# Patient Record
Sex: MALE | Race: WHITE | Employment: FULL TIME | URBAN - METROPOLITAN AREA
[De-identification: names, ages, dates, MRNs, and addresses within clinical notes are randomized per-mention and may not be internally consistent; named-entity substitution may affect disease eponyms.]

---

## 2019-11-07 ENCOUNTER — LAB ENCOUNTER (OUTPATIENT)
Dept: LAB | Age: 34
End: 2019-11-07
Attending: INTERNAL MEDICINE
Payer: COMMERCIAL

## 2019-11-07 ENCOUNTER — OFFICE VISIT (OUTPATIENT)
Dept: INTERNAL MEDICINE CLINIC | Facility: CLINIC | Age: 34
End: 2019-11-07
Payer: COMMERCIAL

## 2019-11-07 VITALS
TEMPERATURE: 98 F | DIASTOLIC BLOOD PRESSURE: 82 MMHG | WEIGHT: 315 LBS | HEIGHT: 77 IN | HEART RATE: 75 BPM | SYSTOLIC BLOOD PRESSURE: 128 MMHG | OXYGEN SATURATION: 97 % | BODY MASS INDEX: 37.19 KG/M2 | RESPIRATION RATE: 18 BRPM

## 2019-11-07 DIAGNOSIS — L40.9 PSORIASIS: ICD-10-CM

## 2019-11-07 DIAGNOSIS — M25.511 RIGHT SHOULDER PAIN, UNSPECIFIED CHRONICITY: ICD-10-CM

## 2019-11-07 DIAGNOSIS — Z00.00 PHYSICAL EXAM: ICD-10-CM

## 2019-11-07 DIAGNOSIS — R42 VERTIGO: ICD-10-CM

## 2019-11-07 DIAGNOSIS — Z00.00 PHYSICAL EXAM: Primary | ICD-10-CM

## 2019-11-07 DIAGNOSIS — E66.9 OBESITY, UNSPECIFIED CLASSIFICATION, UNSPECIFIED OBESITY TYPE, UNSPECIFIED WHETHER SERIOUS COMORBIDITY PRESENT: ICD-10-CM

## 2019-11-07 PROCEDURE — 36415 COLL VENOUS BLD VENIPUNCTURE: CPT

## 2019-11-07 PROCEDURE — 80061 LIPID PANEL: CPT

## 2019-11-07 PROCEDURE — 80053 COMPREHEN METABOLIC PANEL: CPT

## 2019-11-07 PROCEDURE — 85025 COMPLETE CBC W/AUTO DIFF WBC: CPT

## 2019-11-07 PROCEDURE — 84443 ASSAY THYROID STIM HORMONE: CPT

## 2019-11-07 PROCEDURE — 99385 PREV VISIT NEW AGE 18-39: CPT | Performed by: INTERNAL MEDICINE

## 2019-11-07 RX ORDER — MECLIZINE HCL 12.5 MG/1
25 TABLET ORAL 3 TIMES DAILY PRN
Qty: 30 TABLET | Refills: 0 | Status: SHIPPED | OUTPATIENT
Start: 2019-11-07 | End: 2020-08-19 | Stop reason: ALTCHOICE

## 2019-11-07 RX ORDER — IBUPROFEN 800 MG/1
800 TABLET ORAL EVERY 6 HOURS PRN
COMMUNITY

## 2019-11-07 NOTE — PROGRESS NOTES
HPI:    Patient ID: Angeline Lopez is a 29year old male.     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 Endocrine: Negative for cold intolerance and heat intolerance. Genitourinary: Negative for dysuria, hematuria, testicular pain and urgency.    Musculoskeletal: Positive for arth Cardiovascular: Normal rate, regular rhythm and normal heart sounds. No murmur heard. Pulmonary/Chest: Effort normal and breath sounds normal. No respiratory distress. He has no wheezes. He has no rales. Abdominal: Soft.  He exhibits no distension an pain.  This appears to be more muscular in nature and is probably secondary to overuse syndrome from patient's repeated pitching/throwing baseball with that arm. We will get physical therapy evaluation. Patient has significant plaque psoriasis.   He singh

## 2019-11-08 ENCOUNTER — TELEPHONE (OUTPATIENT)
Dept: INTERNAL MEDICINE CLINIC | Facility: CLINIC | Age: 34
End: 2019-11-08

## 2019-11-08 PROBLEM — E66.9 OBESITY: Status: ACTIVE | Noted: 2019-11-08

## 2019-11-08 NOTE — TELEPHONE ENCOUNTER
----- Message from Shireen Gonzales MD sent at 11/8/2019 10:59 AM CST -----  Cholesterol is 190 with LDL of 125--goal LDL in this patient would be less than 130. This would improve further with good dietary habits, low-cholesterol diet and weight loss.   O

## 2019-11-27 ENCOUNTER — OFFICE VISIT (OUTPATIENT)
Dept: INTERNAL MEDICINE CLINIC | Facility: CLINIC | Age: 34
End: 2019-11-27
Payer: COMMERCIAL

## 2019-11-27 VITALS
OXYGEN SATURATION: 98 % | HEART RATE: 68 BPM | HEIGHT: 76 IN | SYSTOLIC BLOOD PRESSURE: 126 MMHG | BODY MASS INDEX: 38.36 KG/M2 | WEIGHT: 315 LBS | TEMPERATURE: 99 F | DIASTOLIC BLOOD PRESSURE: 84 MMHG

## 2019-11-27 DIAGNOSIS — R42 VERTIGO: Primary | ICD-10-CM

## 2019-11-27 DIAGNOSIS — E66.9 OBESITY, UNSPECIFIED CLASSIFICATION, UNSPECIFIED OBESITY TYPE, UNSPECIFIED WHETHER SERIOUS COMORBIDITY PRESENT: ICD-10-CM

## 2019-11-27 PROCEDURE — 99213 OFFICE O/P EST LOW 20 MIN: CPT | Performed by: INTERNAL MEDICINE

## 2019-11-27 PROCEDURE — 1111F DSCHRG MED/CURRENT MED MERGE: CPT | Performed by: INTERNAL MEDICINE

## 2019-11-27 NOTE — PROGRESS NOTES
HPI:    Patient ID: Derrell Harris is a 29year old male. At last visit, patient was started on meclizine for management of vertigo-like symptoms. He had been taking this for 5 days but did not note any significant change in his symptoms.   Continues to n Outpatient Medications   Medication Sig Dispense Refill   • ibuprofen 800 MG Oral Tab Take 800 mg by mouth every 6 (six) hours as needed for Pain. • Meclizine HCl 12.5 MG Oral Tab Take 2 tablets (25 mg total) by mouth 3 (three) times daily as needed.  3 it.  Therefore will not resume meclizine at this time. Patient also reminded to abstain from alcohol. Will refer patient to neurology for further evaluation/management. Patient agrees to make appointment.     We have also requested records from [facility]

## 2019-12-03 ENCOUNTER — TELEPHONE (OUTPATIENT)
Dept: NEUROLOGY | Facility: CLINIC | Age: 34
End: 2019-12-03

## 2019-12-03 ENCOUNTER — OFFICE VISIT (OUTPATIENT)
Dept: NEUROLOGY | Facility: CLINIC | Age: 34
End: 2019-12-03
Payer: COMMERCIAL

## 2019-12-03 VITALS — RESPIRATION RATE: 18 BRPM | BODY MASS INDEX: 38.36 KG/M2 | WEIGHT: 315 LBS | HEIGHT: 76 IN

## 2019-12-03 DIAGNOSIS — G47.9 SLEEP DISTURBANCES: ICD-10-CM

## 2019-12-03 DIAGNOSIS — R42 VERTIGO: Primary | ICD-10-CM

## 2019-12-03 PROCEDURE — 99204 OFFICE O/P NEW MOD 45 MIN: CPT | Performed by: OTHER

## 2019-12-03 NOTE — TELEPHONE ENCOUNTER
Lisy for [facility] [employer] for authorization of approval for CT temporal bones w/wo cpt code 35678. Approval was given withAuthorization Number: [id number] effective 12/03/19 to 01/17/20. Will call Pt. To inform. Pt.  Informed of approval. Transferred call t

## 2019-12-03 NOTE — PROGRESS NOTES
Neurology Initial Visit     Referred By: Dr. Rapp ref. provider found    Chief Complaint: Patient presents with:  Dizziness: New patient referred by Dr. Dina Crouch for vertigo.  States that he has had vertigo for about a month, patient states that dizziness come Father    • Diabetes Mother    • Cancer Mother    • Cancer Maternal Grandfather    • Heart Disorder Paternal Grandfather    • Stroke Paternal Grandfather          Current Outpatient Medications:   •  ibuprofen 800 MG Oral Tab, Take 800 mg by mouth every 6 symmetric  Patellar 2+ bilateral symmetric  Ankle jerk 2+ bilateral symmetric    No clonus  No Babinski sign    Coordination:  Finger to nose intact  Rapid alternating movements intact    Gait:  Normal posture  Normal physiologic      Labs:    Lab Results

## 2019-12-05 ENCOUNTER — TELEPHONE (OUTPATIENT)
Dept: PHYSICAL THERAPY | Facility: HOSPITAL | Age: 34
End: 2019-12-05

## 2019-12-07 ENCOUNTER — HOSPITAL ENCOUNTER (OUTPATIENT)
Dept: CT IMAGING | Facility: HOSPITAL | Age: 34
Discharge: HOME | End: 2019-12-07
Attending: Other
Payer: COMMERCIAL

## 2019-12-07 DIAGNOSIS — R42 VERTIGO: ICD-10-CM

## 2019-12-07 PROCEDURE — 70480 CT ORBIT/EAR/FOSSA W/O DYE: CPT | Performed by: OTHER

## 2019-12-09 ENCOUNTER — TELEPHONE (OUTPATIENT)
Dept: NEUROLOGY | Facility: CLINIC | Age: 34
End: 2019-12-09

## 2019-12-09 DIAGNOSIS — G47.10 HYPERSOMNIA: Primary | ICD-10-CM

## 2019-12-09 NOTE — TELEPHONE ENCOUNTER
----- Message from Alycia Ballard MD sent at 12/9/2019  7:26 AM CST -----  Please let the patient know that CT of the temporal bones didn't show any abnormality with inner ear, but it did show sinusitis.  Make sure he discusses it with ENT and neuro-o

## 2019-12-10 ENCOUNTER — OFFICE VISIT (OUTPATIENT)
Dept: INTERNAL MEDICINE CLINIC | Facility: CLINIC | Age: 34
End: 2019-12-10
Payer: COMMERCIAL

## 2019-12-10 VITALS
DIASTOLIC BLOOD PRESSURE: 82 MMHG | HEIGHT: 76 IN | BODY MASS INDEX: 38.36 KG/M2 | HEART RATE: 77 BPM | TEMPERATURE: 98 F | WEIGHT: 315 LBS | OXYGEN SATURATION: 99 % | SYSTOLIC BLOOD PRESSURE: 132 MMHG

## 2019-12-10 DIAGNOSIS — J32.9 CHRONIC SINUSITIS, UNSPECIFIED LOCATION: ICD-10-CM

## 2019-12-10 DIAGNOSIS — L40.9 PSORIASIS: ICD-10-CM

## 2019-12-10 DIAGNOSIS — L02.811 SCALP ABSCESS: Primary | ICD-10-CM

## 2019-12-10 PROCEDURE — 99213 OFFICE O/P EST LOW 20 MIN: CPT | Performed by: INTERNAL MEDICINE

## 2019-12-10 RX ORDER — AMOXICILLIN AND CLAVULANATE POTASSIUM 875; 125 MG/1; MG/1
1 TABLET, FILM COATED ORAL 2 TIMES DAILY
Qty: 20 TABLET | Refills: 0 | Status: SHIPPED | OUTPATIENT
Start: 2019-12-10 | End: 2019-12-20

## 2019-12-10 NOTE — PROGRESS NOTES
HPI:    Patient ID: Roberta Alan is a 29year old male. Patient presents for follow-up. He still does note some vertigo-like sensation  He saw neurologist who felt patient's symptoms were more peripherally vestibular related.   He sent patient for CT sc There is some swelling, erythema and warmth noted over left mastoid area. No fluctuance noted. No drainage noted. Neck: Neck supple. Cardiovascular: Normal rate, regular rhythm and normal heart sounds.    Pulmonary/Chest: Effort normal and breath so

## 2019-12-10 NOTE — TELEPHONE ENCOUNTER
Cristobal [email or web address] called with update. PA# [id number] States Sleep study was denied. Reason for denial was Pt. would have to have a home sleep study with abnormality first in order to proceed with outpatient diagnostic sleep study.  Will inform Dr. Gilberto Delgado

## 2019-12-19 ENCOUNTER — APPOINTMENT (OUTPATIENT)
Dept: PHYSICAL THERAPY | Facility: HOSPITAL | Age: 34
End: 2019-12-19
Attending: Other
Payer: COMMERCIAL

## 2020-01-03 ENCOUNTER — APPOINTMENT (OUTPATIENT)
Dept: PHYSICAL THERAPY | Facility: HOSPITAL | Age: 35
End: 2020-01-03
Attending: Other
Payer: COMMERCIAL

## 2020-01-08 ENCOUNTER — APPOINTMENT (OUTPATIENT)
Dept: PHYSICAL THERAPY | Facility: HOSPITAL | Age: 35
End: 2020-01-08
Attending: Other
Payer: COMMERCIAL

## 2020-01-10 ENCOUNTER — APPOINTMENT (OUTPATIENT)
Dept: PHYSICAL THERAPY | Facility: HOSPITAL | Age: 35
End: 2020-01-10
Attending: Other
Payer: COMMERCIAL

## 2020-01-15 ENCOUNTER — APPOINTMENT (OUTPATIENT)
Dept: PHYSICAL THERAPY | Facility: HOSPITAL | Age: 35
End: 2020-01-15
Attending: Other
Payer: COMMERCIAL

## 2020-01-21 ENCOUNTER — APPOINTMENT (OUTPATIENT)
Dept: PHYSICAL THERAPY | Facility: HOSPITAL | Age: 35
End: 2020-01-21
Attending: Other
Payer: COMMERCIAL

## 2020-01-23 ENCOUNTER — APPOINTMENT (OUTPATIENT)
Dept: PHYSICAL THERAPY | Facility: HOSPITAL | Age: 35
End: 2020-01-23
Attending: Other
Payer: COMMERCIAL

## 2020-03-11 ENCOUNTER — TELEPHONE (OUTPATIENT)
Dept: INTERNAL MEDICINE CLINIC | Facility: CLINIC | Age: 35
End: 2020-03-11

## 2020-03-11 NOTE — TELEPHONE ENCOUNTER
Pt reports ear ache x3 days. No fever/chills. Advised MD hannon. No openings today. Suggested pt can go to UC today or schedule appt tomorrow. Pt scheduled to see Dr. John Lagos tomorrow.  Advised UC for worsening symptoms fever, pain, dizziness before then--pt verbal

## 2020-03-11 NOTE — TELEPHONE ENCOUNTER
Patient is calling with an ear infection. Patient states he has had an earache for three days and is worried because the last time he had this happen he was dizzy for 6 months and had to get MRIs and other test done to stop it.      Pharmacy: [facility] in [place]

## 2020-05-05 ENCOUNTER — LAB ENCOUNTER (OUTPATIENT)
Dept: LAB | Age: 35
End: 2020-05-05
Attending: PHYSICIAN ASSISTANT
Payer: COMMERCIAL

## 2020-05-05 DIAGNOSIS — L40.0 PSORIASIS VULGARIS: Primary | ICD-10-CM

## 2020-05-05 PROCEDURE — 86480 TB TEST CELL IMMUN MEASURE: CPT

## 2020-05-05 PROCEDURE — 86803 HEPATITIS C AB TEST: CPT

## 2020-05-05 PROCEDURE — 80076 HEPATIC FUNCTION PANEL: CPT

## 2020-05-05 PROCEDURE — 36415 COLL VENOUS BLD VENIPUNCTURE: CPT

## 2020-05-05 PROCEDURE — 80048 BASIC METABOLIC PNL TOTAL CA: CPT

## 2020-05-05 PROCEDURE — 86704 HEP B CORE ANTIBODY TOTAL: CPT

## 2020-05-05 PROCEDURE — 86706 HEP B SURFACE ANTIBODY: CPT

## 2020-05-05 PROCEDURE — 87340 HEPATITIS B SURFACE AG IA: CPT

## 2020-05-05 PROCEDURE — 80061 LIPID PANEL: CPT

## 2020-05-05 PROCEDURE — 85025 COMPLETE CBC W/AUTO DIFF WBC: CPT

## 2020-05-12 ENCOUNTER — TELEPHONE (OUTPATIENT)
Dept: INTERNAL MEDICINE CLINIC | Facility: CLINIC | Age: 35
End: 2020-05-12

## 2020-05-12 NOTE — TELEPHONE ENCOUNTER
Yes patient should self quarantine at home for 2 weeks. He should take his temperature twice daily and notify us if it gets greater than 100.6 or if he should develop significant cough or shortness of breath.   Patient should try to stay in his own room an

## 2020-05-12 NOTE — TELEPHONE ENCOUNTER
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

## 2020-05-12 NOTE — TELEPHONE ENCOUNTER
Respiratory infection triage:    Fever:  [x]  No fever  []  Fever>100.4    Cough:  [] Tight cough  [] Cough with exertion  [] Dry cough  [] Sputum production, Color:     Breathing:  [] Mild shortness of breath interfering with activity  [] Wheezing  [] Jose Ramon Lynn

## 2020-05-14 NOTE — TELEPHONE ENCOUNTER
Call transferred from . Patient and fiance on the line. Patient has been monitoring his symptoms please see documentation below, per Dr Rachel Chan instruction.   Patient has had 3 coworkers that have tested positive for COVID 19    This morning pat

## 2020-05-14 NOTE — TELEPHONE ENCOUNTER
To Dr. Mike Morton  Pt denies cough/respiratory sx. Thermometer broke - no temp. Reviewed your message below with pt, understanding verbalized.

## 2020-05-14 NOTE — TELEPHONE ENCOUNTER
Pt. Called today. He feels clammy, achey body. Wants to speak with the nurse. Please call him at [phone].

## 2020-05-15 DIAGNOSIS — R50.9 FEVER, UNSPECIFIED FEVER CAUSE: Primary | ICD-10-CM

## 2020-05-15 NOTE — TELEPHONE ENCOUNTER
If patient has had temperature of 100.9, he now meets criteria for COVID-19 testing. Same advice regarding self isolation.   I have also now placed order for COVID-19 testing--this will be reviewed and patient will be contacted today and given a time and s

## 2020-05-16 ENCOUNTER — LAB ENCOUNTER (OUTPATIENT)
Dept: LAB | Facility: HOSPITAL | Age: 35
End: 2020-05-16
Attending: INTERNAL MEDICINE
Payer: COMMERCIAL

## 2020-05-16 DIAGNOSIS — R50.9 FEVER, UNSPECIFIED FEVER CAUSE: ICD-10-CM

## 2020-05-18 ENCOUNTER — PATIENT OUTREACH (OUTPATIENT)
Dept: CASE MANAGEMENT | Age: 35
End: 2020-05-18

## 2020-05-18 NOTE — TELEPHONE ENCOUNTER
Per Dr. Michelle Gonzalez' result note message, dictated earlier this morning:    Terry Mckeon MD  P [facility] Clinical Staff             COVID-19 testing is coming back positive. Therefore patient needs to remain at home and self quarantine.    He should stay

## 2020-05-18 NOTE — PROGRESS NOTES
Home Monitoring Condition Update    Covid19+ test date: 5/16/20  Contact date: 5/18/20      Consent Verification:  Assessment Completed With: Patient  HIPAA Verified?   Yes    COVID-19 HOME MONITORING 5/18/2020   Temperature 98.3   Reading From Mouth   Pu call tomorrow to get a condition update  Patient advised to inform their Employee Health department or Manager when they have tested positive for COVID-19.     The patient was also directed to continue to isolate away from other household members when [address]

## 2020-05-18 NOTE — TELEPHONE ENCOUNTER
Pt tested positive for Cov 19 and he like to know what he should do now. Pt also had a few questions regarding the testing.

## 2020-05-19 ENCOUNTER — PATIENT OUTREACH (OUTPATIENT)
Dept: CASE MANAGEMENT | Age: 35
End: 2020-05-19

## 2020-05-20 NOTE — PROGRESS NOTES
Home Monitoring Condition Update    Covid19+ test date: 5/16/2020      Consent Verification:  Assessment Completed With: Patient  HIPAA Verified?   Yes    COVID-19 HOME MONITORING 5/20/2020   Temperature 98.4   Reading From Mouth   Pulse 60   Pulse taken The patient was also directed to continue to isolate away from other household members when possible and stay completely isolated from the general public 3 days after symptoms resolve or 10 days total (whichever is longer).   Advised patient If symptoms

## 2020-05-21 ENCOUNTER — PATIENT OUTREACH (OUTPATIENT)
Dept: CASE MANAGEMENT | Age: 35
End: 2020-05-21

## 2020-05-21 NOTE — PROGRESS NOTES
Home Monitoring Condition Update    Covid19+ test date: 5/16/2020      Consent Verification:  Assessment Completed With: Patient  HIPAA Verified?   Yes    COVID-19 HOME MONITORING 5/21/2020   Temperature 98.5   Reading From Mouth   Pulse 88   Pulse taken when possible and stay completely isolated from the general public 3 days after symptoms resolve or 10 days total (whichever is longer). Advised patient If symptoms worsen/ medical emergency to call 911.       Time spent this encounter reviewing chart, spe

## 2020-05-22 ENCOUNTER — VIRTUAL PHONE E/M (OUTPATIENT)
Dept: INTERNAL MEDICINE CLINIC | Facility: CLINIC | Age: 35
End: 2020-05-22
Payer: COMMERCIAL

## 2020-05-22 ENCOUNTER — TELEPHONE (OUTPATIENT)
Dept: CASE MANAGEMENT | Age: 35
End: 2020-05-22

## 2020-05-22 DIAGNOSIS — U07.1 COVID-19 VIRUS INFECTION: Primary | ICD-10-CM

## 2020-05-22 DIAGNOSIS — E66.9 OBESITY, UNSPECIFIED CLASSIFICATION, UNSPECIFIED OBESITY TYPE, UNSPECIFIED WHETHER SERIOUS COMORBIDITY PRESENT: ICD-10-CM

## 2020-05-22 PROCEDURE — 99213 OFFICE O/P EST LOW 20 MIN: CPT | Performed by: INTERNAL MEDICINE

## 2020-05-22 NOTE — TELEPHONE ENCOUNTER
Patient had a virtual visit today with PCP. Ok to discharge from home monitoring program? If continued monitoring needed, how frequently and for how long? Please advise. Please respond to Celso Wasserman.

## 2020-05-22 NOTE — PROGRESS NOTES
Virtual Telephone Check-In    Derrell Harris verbally consents to a Virtual/Telephone Check-In visit on 05/22/20. Patient has been referred to the [facility] website at [email or web address] to review the yearly Consent to Treat document.     Patient understand

## 2020-06-10 ENCOUNTER — TELEPHONE (OUTPATIENT)
Dept: INTERNAL MEDICINE CLINIC | Facility: CLINIC | Age: 35
End: 2020-06-10

## 2020-06-10 NOTE — TELEPHONE ENCOUNTER
Letter sent through communications per MD direction. Patient called, stated he will access the letter through [address] No need to fax.

## 2020-06-10 NOTE — TELEPHONE ENCOUNTER
Patient had virtual visit with Dr Aimee Chew 5/22  He was released & went back to work 6/1  Employer still has not received return to work note  Patient asks that it is emailed today to    [email or web address]  And also to patient via [address]

## 2020-08-17 ENCOUNTER — TELEPHONE (OUTPATIENT)
Dept: INTERNAL MEDICINE CLINIC | Facility: CLINIC | Age: 35
End: 2020-08-17

## 2020-08-17 NOTE — TELEPHONE ENCOUNTER
Wife called     For the last 3 -4 weeks patient has been having blurry spots in his vision  Also has headaches every day    Ordered new glasses which did not help    Please call patient to triage/advise [phone]

## 2020-08-19 ENCOUNTER — OFFICE VISIT (OUTPATIENT)
Dept: INTERNAL MEDICINE CLINIC | Facility: CLINIC | Age: 35
End: 2020-08-19
Payer: COMMERCIAL

## 2020-08-19 VITALS
OXYGEN SATURATION: 97 % | BODY MASS INDEX: 37.75 KG/M2 | DIASTOLIC BLOOD PRESSURE: 82 MMHG | TEMPERATURE: 99 F | HEART RATE: 72 BPM | SYSTOLIC BLOOD PRESSURE: 122 MMHG | HEIGHT: 76 IN | WEIGHT: 310 LBS

## 2020-08-19 DIAGNOSIS — L40.9 PSORIASIS: ICD-10-CM

## 2020-08-19 DIAGNOSIS — R51.9 HEADACHE DISORDER: Primary | ICD-10-CM

## 2020-08-19 DIAGNOSIS — H53.9 VISION CHANGES: ICD-10-CM

## 2020-08-19 DIAGNOSIS — Z86.16 HISTORY OF 2019 NOVEL CORONAVIRUS DISEASE (COVID-19): ICD-10-CM

## 2020-08-19 PROCEDURE — 3008F BODY MASS INDEX DOCD: CPT | Performed by: INTERNAL MEDICINE

## 2020-08-19 PROCEDURE — 99213 OFFICE O/P EST LOW 20 MIN: CPT | Performed by: INTERNAL MEDICINE

## 2020-08-19 PROCEDURE — 3079F DIAST BP 80-89 MM HG: CPT | Performed by: INTERNAL MEDICINE

## 2020-08-19 PROCEDURE — 3074F SYST BP LT 130 MM HG: CPT | Performed by: INTERNAL MEDICINE

## 2020-08-19 NOTE — PROGRESS NOTES
HPI:    Patient ID: Torres Horn is a 29year old male. Presents with c/o of pain behind left eye along with change in vision that started approximately 3 to 4 weeks ago.   Patient describes vision changes as a smudge that he sees--sometimes occurring in Mouth/Throat: Oropharynx is clear and moist. No oropharyngeal exudate. There is no tenderness to palpation over maxillary sinus areas. Eyes: Pupils are equal, round, and reactive to light.  Conjunctivae and EOM are normal.   Both pupils are sensitive

## 2020-08-21 ENCOUNTER — TELEPHONE (OUTPATIENT)
Dept: INTERNAL MEDICINE CLINIC | Facility: CLINIC | Age: 35
End: 2020-08-21

## 2020-08-22 ENCOUNTER — HOSPITAL ENCOUNTER (OUTPATIENT)
Dept: MRI IMAGING | Facility: HOSPITAL | Age: 35
Discharge: HOME | End: 2020-08-22
Attending: INTERNAL MEDICINE
Payer: COMMERCIAL

## 2020-08-22 DIAGNOSIS — H53.9 VISION CHANGES: ICD-10-CM

## 2020-08-22 PROCEDURE — 70551 MRI BRAIN STEM W/O DYE: CPT | Performed by: INTERNAL MEDICINE

## 2020-08-22 NOTE — TELEPHONE ENCOUNTER
Received a phone call from patient. This was approximately 5 PM.  Patient indicated that he was having blurry vision. Left eye mostly. Like a \"dust\" was over his eye. Also in his right eye. The right eye problem is been for 3 weeks.   The left eye fo

## 2020-08-26 ENCOUNTER — OFFICE VISIT (OUTPATIENT)
Dept: INTERNAL MEDICINE CLINIC | Facility: CLINIC | Age: 35
End: 2020-08-26
Payer: COMMERCIAL

## 2020-08-26 VITALS
SYSTOLIC BLOOD PRESSURE: 124 MMHG | TEMPERATURE: 98 F | DIASTOLIC BLOOD PRESSURE: 86 MMHG | OXYGEN SATURATION: 98 % | WEIGHT: 308.81 LBS | BODY MASS INDEX: 37.6 KG/M2 | HEIGHT: 76 IN | HEART RATE: 88 BPM

## 2020-08-26 DIAGNOSIS — R73.9 HYPERGLYCEMIA: ICD-10-CM

## 2020-08-26 DIAGNOSIS — J32.9 CHRONIC SINUSITIS, UNSPECIFIED LOCATION: ICD-10-CM

## 2020-08-26 DIAGNOSIS — H57.12 LEFT EYE PAIN: Primary | ICD-10-CM

## 2020-08-26 DIAGNOSIS — G44.009 CLUSTER HEADACHE, NOT INTRACTABLE, UNSPECIFIED CHRONICITY PATTERN: ICD-10-CM

## 2020-08-26 LAB
CARTRIDGE LOT#: NORMAL NUMERIC
HEMOGLOBIN A1C: 5.2 % (ref 4.3–5.6)

## 2020-08-26 PROCEDURE — 3008F BODY MASS INDEX DOCD: CPT | Performed by: INTERNAL MEDICINE

## 2020-08-26 PROCEDURE — 99213 OFFICE O/P EST LOW 20 MIN: CPT | Performed by: INTERNAL MEDICINE

## 2020-08-26 PROCEDURE — 83036 HEMOGLOBIN GLYCOSYLATED A1C: CPT | Performed by: INTERNAL MEDICINE

## 2020-08-26 PROCEDURE — 3074F SYST BP LT 130 MM HG: CPT | Performed by: INTERNAL MEDICINE

## 2020-08-26 PROCEDURE — 3079F DIAST BP 80-89 MM HG: CPT | Performed by: INTERNAL MEDICINE

## 2020-08-26 PROCEDURE — 36416 COLLJ CAPILLARY BLOOD SPEC: CPT | Performed by: INTERNAL MEDICINE

## 2020-08-27 NOTE — PROGRESS NOTES
HPI:    Patient ID: Kristen Jiménez is a 29year old male. Patient presents for follow-up. Patient continues to note discomfort behind left eye and surrounding left eye.     MRI brain did not show any significant abnormalities other than significant montano Allergies:No Known Allergies   PHYSICAL EXAM:   /86 (BP Location: Right arm, Patient Position: Sitting, Cuff Size: large)   Pulse 88   Temp 98.3 °F (36.8 °C) (Temporal)   Ht 6' 4\" (1.93 m)   Wt (!) 308 lb 12.8 oz (140.1 kg)   SpO2 98%   BMI 37. 5 week.    Hemoglobin A1c done today in office is 5.2--therefore I reassured patient that he does not have diabetes,  Though he may be at risk secondary to family history and he needs to work on healthy weight management to good dietary habits and regular ex

## 2020-09-04 ENCOUNTER — TELEPHONE (OUTPATIENT)
Dept: INTERNAL MEDICINE CLINIC | Facility: CLINIC | Age: 35
End: 2020-09-04

## 2020-09-04 NOTE — TELEPHONE ENCOUNTER
Needs may lab results sent to [facility]  Patient is there now for an appt  Please send asap to  Fax # [phone] for results ordered by LISA Manning

## 2020-09-04 NOTE — TELEPHONE ENCOUNTER
We cannot send records of labs that were ordered by a provider not associated with this practice. Pt should pull up labs on his [employer] or contact medical records.

## 2020-10-05 NOTE — TELEPHONE ENCOUNTER
Mickey Ledesma, called to check on the below request. Munira Arellano is on verbal release as someone we can release any and all information to regarding the patient.  Munira Arellano was informed that the order for blood work was not ordered by any physicians in our office and

## 2021-04-19 ENCOUNTER — IMMUNIZATION (OUTPATIENT)
Dept: LAB | Age: 36
End: 2021-04-19
Attending: HOSPITALIST
Payer: COMMERCIAL

## 2021-04-19 DIAGNOSIS — Z23 NEED FOR VACCINATION: Primary | ICD-10-CM

## 2021-04-19 PROCEDURE — 0001A SARSCOV2 VAC 30MCG/0.3ML IM: CPT

## 2021-05-15 ENCOUNTER — IMMUNIZATION (OUTPATIENT)
Dept: LAB | Age: 36
End: 2021-05-15
Attending: HOSPITALIST
Payer: COMMERCIAL

## 2021-05-15 DIAGNOSIS — Z23 NEED FOR VACCINATION: Primary | ICD-10-CM

## 2021-05-15 PROCEDURE — 0002A SARSCOV2 VAC 30MCG/0.3ML IM: CPT

## 2021-05-26 ENCOUNTER — TELEPHONE (OUTPATIENT)
Dept: INTERNAL MEDICINE CLINIC | Facility: CLINIC | Age: 36
End: 2021-05-26

## 2021-05-26 NOTE — TELEPHONE ENCOUNTER
Called spouse per hipaa and explained that patient needs to be seen first before sleep study can be ordered - transferred to [facility] to schedule patient

## 2021-05-26 NOTE — TELEPHONE ENCOUNTER
Please call [place]   Pt requesting referral for sleep center  Hoping to schedule  Tasked to nursing

## 2021-08-03 ENCOUNTER — APPOINTMENT (OUTPATIENT)
Dept: ULTRASOUND IMAGING | Facility: HOSPITAL | Age: 36
End: 2021-08-03
Payer: COMMERCIAL

## 2021-08-03 ENCOUNTER — APPOINTMENT (OUTPATIENT)
Dept: GENERAL RADIOLOGY | Facility: HOSPITAL | Age: 36
End: 2021-08-03
Attending: NURSE PRACTITIONER
Payer: COMMERCIAL

## 2021-08-03 ENCOUNTER — HOSPITAL ENCOUNTER (EMERGENCY)
Facility: HOSPITAL | Age: 36
Discharge: HOME | End: 2021-08-03
Payer: COMMERCIAL

## 2021-08-03 ENCOUNTER — TELEPHONE (OUTPATIENT)
Dept: INTERNAL MEDICINE CLINIC | Facility: CLINIC | Age: 36
End: 2021-08-03

## 2021-08-03 VITALS
OXYGEN SATURATION: 97 % | HEART RATE: 87 BPM | WEIGHT: 308.63 LBS | SYSTOLIC BLOOD PRESSURE: 152 MMHG | TEMPERATURE: 98 F | BODY MASS INDEX: 38 KG/M2 | DIASTOLIC BLOOD PRESSURE: 76 MMHG | RESPIRATION RATE: 20 BRPM

## 2021-08-03 DIAGNOSIS — M79.661 RIGHT CALF PAIN: Primary | ICD-10-CM

## 2021-08-03 PROCEDURE — 93971 EXTREMITY STUDY: CPT

## 2021-08-03 PROCEDURE — 99284 EMERGENCY DEPT VISIT MOD MDM: CPT

## 2021-08-03 NOTE — ED INITIAL ASSESSMENT (HPI)
Pt reports pain, swelling and \"tightness\" to right calf starting upon waking this morning. Pt denies hx blood clots. Denies sob, chest pain or recent long travel.  Pt reports feeling of constant muscle cramp

## 2021-08-03 NOTE — ED PROVIDER NOTES
Patient Seen in: [facility] AND [facility] Emergency Department      History   Patient presents with:  Leg or Foot Injury    Stated Complaint: right leg pain     HPI/Subjective:   HPI    59-year-old male presents the emergency department for evaluation.   Patient Physical Exam  Vitals reviewed. Constitutional:       Appearance: Normal appearance. HENT:      Head: Normocephalic and atraumatic.       Right Ear: External ear normal.      Left Ear: External ear normal.      Nose: Nose normal.      Mouth/Thro Patient is ambulatory without difficulty. Vital signs are stable. Return as needed.                            Disposition and Plan     Clinical Impression:  Right calf pain  (primary encounter diagnosis)     Disposition:  Discharge  8/3/2021  7:25 pm

## 2021-08-03 NOTE — TELEPHONE ENCOUNTER
Patient's wife [employer] is calling this morning patient developed pain in his right calf, the pain has been going on all day  He is afraid it is a blood clot    Patient would like to be seen, what should patient do   Please call patient [phone]

## 2021-08-03 NOTE — TELEPHONE ENCOUNTER
Called patient who states he has pain in right calf since this morning ,feel really tight.  RN instructed patient to go to ER to R/O DVT - agrees F/U 8/4

## 2021-08-04 NOTE — TELEPHONE ENCOUNTER
No need for follow-up at this time. Ultrasound is negative for DVT.   If no symptomatic improvement in 7 -10 days, should then schedule office evaluation

## 2021-08-04 NOTE — TELEPHONE ENCOUNTER
Pt cancelled appt for today  Pt wanted Dr Marco A Kohli to be aware  US results are in patient chart  If patient does need to be seen in the office please call him  Tasked to Dr Marco A Kohli

## 2021-08-04 NOTE — TELEPHONE ENCOUNTER
I spoke with patient to follow up on ER evaluation from 8/3/21. Patient says he does not have a DVT but he does have ongoing tightness in his muscle. He says that he is on a new medication called Jose Martin Copa and he cannot take many medications because of this.  Vidya Rivera

## 2021-08-10 ENCOUNTER — OFFICE VISIT (OUTPATIENT)
Dept: INTERNAL MEDICINE CLINIC | Facility: CLINIC | Age: 36
End: 2021-08-10
Payer: COMMERCIAL

## 2021-08-10 VITALS
WEIGHT: 315 LBS | HEIGHT: 76 IN | TEMPERATURE: 99 F | BODY MASS INDEX: 38.36 KG/M2 | OXYGEN SATURATION: 98 % | HEART RATE: 83 BPM | DIASTOLIC BLOOD PRESSURE: 90 MMHG | SYSTOLIC BLOOD PRESSURE: 130 MMHG

## 2021-08-10 DIAGNOSIS — L40.9 PSORIASIS: ICD-10-CM

## 2021-08-10 DIAGNOSIS — E66.9 OBESITY, UNSPECIFIED CLASSIFICATION, UNSPECIFIED OBESITY TYPE, UNSPECIFIED WHETHER SERIOUS COMORBIDITY PRESENT: ICD-10-CM

## 2021-08-10 DIAGNOSIS — M79.661 RIGHT CALF PAIN: ICD-10-CM

## 2021-08-10 DIAGNOSIS — M54.10 RADICULAR PAIN OF RIGHT LOWER EXTREMITY: Primary | ICD-10-CM

## 2021-08-10 PROCEDURE — 99213 OFFICE O/P EST LOW 20 MIN: CPT | Performed by: INTERNAL MEDICINE

## 2021-08-10 PROCEDURE — 3075F SYST BP GE 130 - 139MM HG: CPT | Performed by: INTERNAL MEDICINE

## 2021-08-10 PROCEDURE — 3080F DIAST BP >= 90 MM HG: CPT | Performed by: INTERNAL MEDICINE

## 2021-08-10 PROCEDURE — 3008F BODY MASS INDEX DOCD: CPT | Performed by: INTERNAL MEDICINE

## 2021-08-10 RX ORDER — METHYLPREDNISOLONE 4 MG/1
TABLET ORAL
Qty: 1 EACH | Refills: 0 | Status: SHIPPED | OUTPATIENT
Start: 2021-08-10

## 2021-08-10 RX ORDER — CLOBETASOL PROPIONATE 0.46 MG/ML
SOLUTION TOPICAL
COMMUNITY
Start: 2021-07-23

## 2021-08-10 NOTE — PROGRESS NOTES
HPI:    Patient ID: Quynh Winters is a 28year old male. Presents with c/o right calf pain started 8/2 the evening after playing baseball. Symptoms were predominantly in the right calf and patient presented to emergency room on 8/3.   Venous ultrasound w (six) hours as needed for Pain. Allergies:No Known Allergies   PHYSICAL EXAM:   /90   Pulse 83   Temp 98.8 °F (37.1 °C) (Oral)   Ht 6' 4\" (1.93 m)   Wt (!) 372 lb 9.6 oz (169 kg)   SpO2 98%   BMI 45.35 kg/m²   Physical Exam  Vitals reviewed. needed. We will also add Medrol Dosepak x1. We will get physical therapy evaluation. If no improvement with physical therapy, patient will then need imaging studies.     Certainly patient's increased weight gain over the last year will be contributing to

## 2021-08-30 ENCOUNTER — TELEPHONE (OUTPATIENT)
Dept: PHYSICAL THERAPY | Facility: HOSPITAL | Age: 36
End: 2021-08-30

## 2021-09-07 ENCOUNTER — APPOINTMENT (OUTPATIENT)
Dept: PHYSICAL THERAPY | Age: 36
End: 2021-09-07
Attending: INTERNAL MEDICINE
Payer: COMMERCIAL

## 2021-09-09 ENCOUNTER — TELEPHONE (OUTPATIENT)
Dept: PHYSICAL THERAPY | Facility: HOSPITAL | Age: 36
End: 2021-09-09

## 2021-09-09 ENCOUNTER — APPOINTMENT (OUTPATIENT)
Dept: PHYSICAL THERAPY | Age: 36
End: 2021-09-09
Attending: INTERNAL MEDICINE
Payer: COMMERCIAL

## 2021-09-14 ENCOUNTER — OFFICE VISIT (OUTPATIENT)
Dept: PHYSICAL THERAPY | Age: 36
End: 2021-09-14
Attending: INTERNAL MEDICINE
Payer: COMMERCIAL

## 2021-09-14 PROCEDURE — 97162 PT EVAL MOD COMPLEX 30 MIN: CPT

## 2021-09-14 PROCEDURE — 97110 THERAPEUTIC EXERCISES: CPT

## 2021-09-14 NOTE — PROGRESS NOTES
SPINE EVALUATION:   Referring Physician: Dr. Ju Hahn  Diagnosis: LBP w/ R LE radiculopathy down to R calf    Date of Service: 9/14/2021     PATIENT SUMMARY   Jorge A Reich is a 28year old male who presents to therapy today with complaints of LBP and RLE r tightness. Extension: 100% w/o issue.   Sidebending: R 75% w/ R low back and glute pain; L 100%    Accessory motion: n/t  Palpation: n/t    Strength: (* denotes performed with pain)  LE   Hip flexion (L2): R 5/5; L 5/5  Hip abduction: R 4-/5; L 4-/5   Knee symptoms for 3 consecutive days to improve function with ADL   · Pt will have decreased paraspinal mm tension to tolerate standing >60 minutes for work and home activities   · Pt will demonstrate improved core strength to be able to perform 4+/5 with <1/10

## 2021-09-15 ENCOUNTER — TELEPHONE (OUTPATIENT)
Dept: PHYSICAL THERAPY | Facility: HOSPITAL | Age: 36
End: 2021-09-15

## 2021-09-16 ENCOUNTER — OFFICE VISIT (OUTPATIENT)
Dept: PHYSICAL THERAPY | Age: 36
End: 2021-09-16
Attending: INTERNAL MEDICINE
Payer: COMMERCIAL

## 2021-09-16 PROCEDURE — 97110 THERAPEUTIC EXERCISES: CPT

## 2021-09-16 PROCEDURE — 97140 MANUAL THERAPY 1/> REGIONS: CPT

## 2021-09-17 NOTE — PROGRESS NOTES
Dx: LBP w/ R LE radiculopathy down to R calf           Insurance (Authorized # of Visits):  No auth required           Authorizing Physician: Dr. Ju Temple MD visit: none scheduled  Fall Risk: standard         Precautions: n/a             Subjective: pt TX#: 5/ Date:    Tx#: 6/   PPU - hands w/ exhale overpressure 3x10       Prone on elbows - 2' (abolished radic s/s)       B gastroc slant stretch 3x15\"       Seated ankle pumps x20        Modified piriformis stretch      LTRs x10       PPTs x

## 2021-09-21 ENCOUNTER — OFFICE VISIT (OUTPATIENT)
Dept: PHYSICAL THERAPY | Age: 36
End: 2021-09-21
Attending: INTERNAL MEDICINE
Payer: COMMERCIAL

## 2021-09-21 PROCEDURE — 97110 THERAPEUTIC EXERCISES: CPT

## 2021-09-21 PROCEDURE — 97140 MANUAL THERAPY 1/> REGIONS: CPT

## 2021-09-21 NOTE — PROGRESS NOTES
Dx: LBP w/ R LE radiculopathy down to R calf           Insurance (Authorized # of Visits):  No auth required           Authorizing Physician: Dr. Danielito Zamarripa  Next MD visit: none scheduled  Fall Risk: standard         Precautions: n/a             Subjective: pt TX#: 4/ Date:                 TX#: 5/ Date:    Tx#: 6/   PPU - hands w/ exhale overpressure 3x10 PPU - hands w/ exhale overpressure 3x10      Prone on elbows - 2' (abolished radic s/s) Prone on elbows - 2'      B gastroc slant stretch 3x15\"       Seated

## 2021-09-23 ENCOUNTER — TELEPHONE (OUTPATIENT)
Dept: PHYSICAL THERAPY | Facility: HOSPITAL | Age: 36
End: 2021-09-23

## 2021-09-23 ENCOUNTER — APPOINTMENT (OUTPATIENT)
Dept: PHYSICAL THERAPY | Age: 36
End: 2021-09-23
Attending: INTERNAL MEDICINE
Payer: COMMERCIAL

## 2021-09-28 ENCOUNTER — OFFICE VISIT (OUTPATIENT)
Dept: PHYSICAL THERAPY | Age: 36
End: 2021-09-28
Attending: INTERNAL MEDICINE
Payer: COMMERCIAL

## 2021-09-28 PROCEDURE — 97110 THERAPEUTIC EXERCISES: CPT

## 2021-09-28 PROCEDURE — 97140 MANUAL THERAPY 1/> REGIONS: CPT

## 2021-09-28 NOTE — PROGRESS NOTES
Dx: LBP w/ R LE radiculopathy down to R calf           Insurance (Authorized # of Visits):  No auth required           Authorizing Physician: Dr. Effie Lenz  Next MD visit: none scheduled  Fall Risk: standard         Precautions: n/a             Subjective: pt auth required Date:   9/21/21              TX#: 3/no auth req Date:   9/28/21              TX#: 4/no Marino Pretty req Date:                 TX#: 5/ Date:    Tx#: 6/   PPU - hands w/ exhale overpressure 3x10 PPU - hands w/ exhale overpressure 3x10 PPU - hands w/ exh

## 2021-09-30 ENCOUNTER — TELEPHONE (OUTPATIENT)
Dept: PHYSICAL THERAPY | Age: 36
End: 2021-09-30

## 2021-09-30 ENCOUNTER — APPOINTMENT (OUTPATIENT)
Dept: PHYSICAL THERAPY | Age: 36
End: 2021-09-30
Attending: INTERNAL MEDICINE
Payer: COMMERCIAL

## 2021-10-12 ENCOUNTER — OFFICE VISIT (OUTPATIENT)
Dept: PHYSICAL THERAPY | Age: 36
End: 2021-10-12
Attending: INTERNAL MEDICINE
Payer: COMMERCIAL

## 2021-10-12 PROCEDURE — 97110 THERAPEUTIC EXERCISES: CPT

## 2021-10-12 PROCEDURE — 97140 MANUAL THERAPY 1/> REGIONS: CPT

## 2021-10-12 NOTE — PROGRESS NOTES
Dx: LBP w/ R LE radiculopathy down to R calf           Insurance (Authorized # of Visits):  No auth required           Authorizing Physician: Dr. Mallory Mcginnis Next MD visit: none scheduled  Fall Risk: standard         Precautions: n/a              Subjective: pt 9/16/2021  TX#: 2/no auth required Date:   9/21/21              TX#: 3/no auth req Date:   9/28/21              TX#: 4/no Lela Lowdemarco req Date:   10/12/21              TX#: Pham Ramirez req Date:    Tx#: 6/   PPU - hands w/ exhale overpressure 3x10 PPU - hands w/ exh

## 2021-10-14 ENCOUNTER — APPOINTMENT (OUTPATIENT)
Dept: PHYSICAL THERAPY | Age: 36
End: 2021-10-14
Attending: INTERNAL MEDICINE
Payer: COMMERCIAL

## 2021-10-14 ENCOUNTER — TELEPHONE (OUTPATIENT)
Dept: PHYSICAL THERAPY | Age: 36
End: 2021-10-14

## 2021-10-18 ENCOUNTER — OFFICE VISIT (OUTPATIENT)
Dept: PHYSICAL THERAPY | Age: 36
End: 2021-10-18
Attending: INTERNAL MEDICINE
Payer: COMMERCIAL

## 2021-10-18 PROCEDURE — 97110 THERAPEUTIC EXERCISES: CPT

## 2021-10-18 PROCEDURE — 97140 MANUAL THERAPY 1/> REGIONS: CPT

## 2021-10-18 NOTE — PROGRESS NOTES
Dx: LBP w/ R LE radiculopathy down to R calf           Insurance (Authorized # of Visits):  No auth required           Authorizing Physician: Dr. Effie Lenz Next MD visit: none scheduled  Fall Risk: standard         Precautions: n/a              Subjective: pt Date:   9/21/21              TX#: 3 Date:   9/28/21              TX#: 4 Date:   10/12/21              TX#: 5 Date: 10/18/21  Tx#: 6   PPU - hands w/ exhale overpressure 3x10 PPU - hands w/ exhale overpressure 3x10 PPU - hands w/ exhale overpressure 2x10 PP

## 2021-10-19 ENCOUNTER — OFFICE VISIT (OUTPATIENT)
Dept: PHYSICAL THERAPY | Age: 36
End: 2021-10-19
Attending: INTERNAL MEDICINE
Payer: COMMERCIAL

## 2021-10-19 PROCEDURE — 97140 MANUAL THERAPY 1/> REGIONS: CPT

## 2021-10-19 PROCEDURE — 97112 NEUROMUSCULAR REEDUCATION: CPT

## 2021-10-19 PROCEDURE — 97110 THERAPEUTIC EXERCISES: CPT

## 2021-10-19 NOTE — PROGRESS NOTES
Dx: LBP w/ R LE radiculopathy down to R calf           Insurance (Authorized # of Visits):  No auth required           Authorizing Physician: Dr. Anup Alvarado Next MD visit: none scheduled  Fall Risk: standard         Precautions: n/a              Subjective: pt centralization of radicular s/s - pt appears to be an extension responder.     Date:   10/12/21              TX#: 5 Date: 10/18/21  Tx#: 6 Date: 10/18/21  Tx#: 7     PPU - hands w/ exhale overpressure 3x10 PPU - hands w/ exhale overpressure 2x10 PPU - hands

## 2021-10-27 ENCOUNTER — APPOINTMENT (OUTPATIENT)
Dept: PHYSICAL THERAPY | Age: 36
End: 2021-10-27
Attending: INTERNAL MEDICINE
Payer: COMMERCIAL

## 2021-10-27 ENCOUNTER — TELEPHONE (OUTPATIENT)
Dept: PHYSICAL THERAPY | Age: 36
End: 2021-10-27

## 2021-11-02 ENCOUNTER — OFFICE VISIT (OUTPATIENT)
Dept: PHYSICAL THERAPY | Age: 36
End: 2021-11-02
Attending: INTERNAL MEDICINE
Payer: COMMERCIAL

## 2021-11-02 ENCOUNTER — APPOINTMENT (OUTPATIENT)
Dept: PHYSICAL THERAPY | Age: 36
End: 2021-11-02
Attending: INTERNAL MEDICINE
Payer: COMMERCIAL

## 2021-11-02 PROCEDURE — 97110 THERAPEUTIC EXERCISES: CPT

## 2021-11-02 PROCEDURE — 97140 MANUAL THERAPY 1/> REGIONS: CPT

## 2021-11-02 NOTE — PROGRESS NOTES
Dx: LBP w/ R LE radiculopathy down to R calf           Insurance (Authorized # of Visits):  No auth required           Authorizing Physician: Dr. Snow Uribe Next MD visit: none scheduled  Fall Risk: standard         Precautions: n/a              Subjective: pt Continue to progress for centralization of radicular s/s - pt appears to be an extension responder.     Date:   10/12/21              TX#: 5 Date: 10/18/21  Tx#: 6 Date: 10/18/21  Tx#: 7 Date: 11/2/21  Tx#: 8    PPU - hands w/ exhale overpressure 3x10 PPU -

## 2021-11-09 ENCOUNTER — APPOINTMENT (OUTPATIENT)
Dept: PHYSICAL THERAPY | Age: 36
End: 2021-11-09
Attending: INTERNAL MEDICINE
Payer: COMMERCIAL

## 2021-11-09 ENCOUNTER — TELEPHONE (OUTPATIENT)
Dept: PHYSICAL THERAPY | Facility: HOSPITAL | Age: 36
End: 2021-11-09

## 2021-11-16 ENCOUNTER — TELEPHONE (OUTPATIENT)
Dept: PHYSICAL THERAPY | Facility: HOSPITAL | Age: 36
End: 2021-11-16

## 2021-11-23 ENCOUNTER — APPOINTMENT (OUTPATIENT)
Dept: PHYSICAL THERAPY | Age: 36
End: 2021-11-23
Attending: INTERNAL MEDICINE
Payer: COMMERCIAL

## 2021-11-30 ENCOUNTER — TELEPHONE (OUTPATIENT)
Dept: PHYSICAL THERAPY | Age: 36
End: 2021-11-30

## 2021-11-30 ENCOUNTER — APPOINTMENT (OUTPATIENT)
Dept: PHYSICAL THERAPY | Age: 36
End: 2021-11-30
Attending: INTERNAL MEDICINE
Payer: COMMERCIAL

## 2021-12-02 ENCOUNTER — LAB ENCOUNTER (OUTPATIENT)
Dept: LAB | Age: 36
End: 2021-12-02
Attending: DERMATOLOGY
Payer: COMMERCIAL

## 2021-12-02 DIAGNOSIS — L40.0 PSORIASIS VULGARIS: Primary | ICD-10-CM

## 2021-12-02 PROCEDURE — 85025 COMPLETE CBC W/AUTO DIFF WBC: CPT

## 2021-12-02 PROCEDURE — 86480 TB TEST CELL IMMUN MEASURE: CPT

## 2021-12-02 PROCEDURE — 80048 BASIC METABOLIC PNL TOTAL CA: CPT

## 2021-12-02 PROCEDURE — 36415 COLL VENOUS BLD VENIPUNCTURE: CPT

## 2021-12-07 ENCOUNTER — APPOINTMENT (OUTPATIENT)
Dept: PHYSICAL THERAPY | Age: 36
End: 2021-12-07
Attending: INTERNAL MEDICINE
Payer: COMMERCIAL

## 2021-12-14 ENCOUNTER — APPOINTMENT (OUTPATIENT)
Dept: PHYSICAL THERAPY | Age: 36
End: 2021-12-14
Attending: INTERNAL MEDICINE
Payer: COMMERCIAL

## 2021-12-21 ENCOUNTER — APPOINTMENT (OUTPATIENT)
Dept: PHYSICAL THERAPY | Age: 36
End: 2021-12-21
Attending: INTERNAL MEDICINE
Payer: COMMERCIAL

## 2021-12-28 ENCOUNTER — APPOINTMENT (OUTPATIENT)
Dept: PHYSICAL THERAPY | Age: 36
End: 2021-12-28
Attending: INTERNAL MEDICINE
Payer: COMMERCIAL

## 2021-12-30 ENCOUNTER — TELEPHONE (OUTPATIENT)
Dept: INTERNAL MEDICINE CLINIC | Facility: CLINIC | Age: 36
End: 2021-12-30

## 2021-12-30 NOTE — TELEPHONE ENCOUNTER
Patient is calling yesterday patient spent too much time outside he got wet  He then developed chest congestion, body aches & temp, sore throat & cough, he said the symptoms are due to being out in the weather    Patient took a rapid covid test 2 days ago,

## 2022-10-06 ENCOUNTER — LAB ENCOUNTER (OUTPATIENT)
Dept: LAB | Age: 37
End: 2022-10-06
Attending: DERMATOLOGY
Payer: COMMERCIAL

## 2022-10-06 DIAGNOSIS — L40.0 PSORIASIS VULGARIS: Primary | ICD-10-CM

## 2022-10-06 LAB
ANION GAP SERPL CALC-SCNC: 7 MMOL/L (ref 0–18)
BASOPHILS # BLD AUTO: 0.05 X10(3) UL (ref 0–0.2)
BASOPHILS NFR BLD AUTO: 0.5 %
BUN BLD-MCNC: 17 MG/DL (ref 7–18)
BUN/CREAT SERPL: 15.6 (ref 10–20)
CALCIUM BLD-MCNC: 9.3 MG/DL (ref 8.5–10.1)
CHLORIDE SERPL-SCNC: 109 MMOL/L (ref 98–112)
CO2 SERPL-SCNC: 25 MMOL/L (ref 21–32)
CREAT BLD-MCNC: 1.09 MG/DL
DEPRECATED RDW RBC AUTO: 43.3 FL (ref 35.1–46.3)
EOSINOPHIL # BLD AUTO: 0.47 X10(3) UL (ref 0–0.7)
EOSINOPHIL NFR BLD AUTO: 5.2 %
ERYTHROCYTE [DISTWIDTH] IN BLOOD BY AUTOMATED COUNT: 12.1 % (ref 11–15)
FASTING STATUS PATIENT QL REPORTED: NO
GFR SERPLBLD BASED ON 1.73 SQ M-ARVRAT: 90 ML/MIN/1.73M2 (ref 60–?)
GLUCOSE BLD-MCNC: 86 MG/DL (ref 70–99)
HCT VFR BLD AUTO: 48.8 %
HGB BLD-MCNC: 16.5 G/DL
IMM GRANULOCYTES # BLD AUTO: 0.03 X10(3) UL (ref 0–1)
IMM GRANULOCYTES NFR BLD: 0.3 %
LYMPHOCYTES # BLD AUTO: 2.74 X10(3) UL (ref 1–4)
LYMPHOCYTES NFR BLD AUTO: 30 %
MCH RBC QN AUTO: 32.7 PG (ref 26–34)
MCHC RBC AUTO-ENTMCNC: 33.8 G/DL (ref 31–37)
MCV RBC AUTO: 96.8 FL
MONOCYTES # BLD AUTO: 0.77 X10(3) UL (ref 0.1–1)
MONOCYTES NFR BLD AUTO: 8.4 %
NEUTROPHILS # BLD AUTO: 5.06 X10 (3) UL (ref 1.5–7.7)
NEUTROPHILS # BLD AUTO: 5.06 X10(3) UL (ref 1.5–7.7)
NEUTROPHILS NFR BLD AUTO: 55.6 %
OSMOLALITY SERPL CALC.SUM OF ELEC: 293 MOSM/KG (ref 275–295)
PLATELET # BLD AUTO: 201 10(3)UL (ref 150–450)
POTASSIUM SERPL-SCNC: 4.2 MMOL/L (ref 3.5–5.1)
RBC # BLD AUTO: 5.04 X10(6)UL
SODIUM SERPL-SCNC: 141 MMOL/L (ref 136–145)
WBC # BLD AUTO: 9.1 X10(3) UL (ref 4–11)

## 2022-10-06 PROCEDURE — 80048 BASIC METABOLIC PNL TOTAL CA: CPT

## 2022-10-06 PROCEDURE — 36415 COLL VENOUS BLD VENIPUNCTURE: CPT

## 2022-10-06 PROCEDURE — 86480 TB TEST CELL IMMUN MEASURE: CPT

## 2022-10-06 PROCEDURE — 85025 COMPLETE CBC W/AUTO DIFF WBC: CPT

## 2022-10-10 LAB
M TB IFN-G CD4+ T-CELLS BLD-ACNC: 0.03 IU/ML
M TB TUBERC IFN-G BLD QL: NEGATIVE
M TB TUBERC IGNF/MITOGEN IGNF CONTROL: >10 IU/ML
QFT TB1 AG MINUS NIL: 0.06 IU/ML
QFT TB2 AG MINUS NIL: 0 IU/ML

## 2022-12-22 ENCOUNTER — TELEPHONE (OUTPATIENT)
Dept: INTERNAL MEDICINE CLINIC | Facility: CLINIC | Age: 37
End: 2022-12-22

## 2022-12-22 ENCOUNTER — OFFICE VISIT (OUTPATIENT)
Dept: INTERNAL MEDICINE CLINIC | Facility: CLINIC | Age: 37
End: 2022-12-22
Payer: COMMERCIAL

## 2022-12-22 VITALS
BODY MASS INDEX: 38.36 KG/M2 | SYSTOLIC BLOOD PRESSURE: 128 MMHG | HEART RATE: 110 BPM | OXYGEN SATURATION: 98 % | WEIGHT: 315 LBS | DIASTOLIC BLOOD PRESSURE: 92 MMHG | HEIGHT: 76 IN | TEMPERATURE: 99 F

## 2022-12-22 DIAGNOSIS — E66.9 OBESITY, UNSPECIFIED CLASSIFICATION, UNSPECIFIED OBESITY TYPE, UNSPECIFIED WHETHER SERIOUS COMORBIDITY PRESENT: ICD-10-CM

## 2022-12-22 DIAGNOSIS — L40.9 PSORIASIS: ICD-10-CM

## 2022-12-22 DIAGNOSIS — R21 RASH: Primary | ICD-10-CM

## 2022-12-22 PROCEDURE — 3008F BODY MASS INDEX DOCD: CPT | Performed by: INTERNAL MEDICINE

## 2022-12-22 PROCEDURE — 3080F DIAST BP >= 90 MM HG: CPT | Performed by: INTERNAL MEDICINE

## 2022-12-22 PROCEDURE — 3074F SYST BP LT 130 MM HG: CPT | Performed by: INTERNAL MEDICINE

## 2022-12-22 PROCEDURE — 99213 OFFICE O/P EST LOW 20 MIN: CPT | Performed by: INTERNAL MEDICINE

## 2022-12-22 RX ORDER — FAMCICLOVIR 500 MG/1
500 TABLET, FILM COATED ORAL 3 TIMES DAILY
Qty: 21 TABLET | Refills: 0 | Status: SHIPPED | OUTPATIENT
Start: 2022-12-22

## 2022-12-22 RX ORDER — AMOXICILLIN AND CLAVULANATE POTASSIUM 875; 125 MG/1; MG/1
1 TABLET, FILM COATED ORAL 2 TIMES DAILY
Qty: 20 TABLET | Refills: 0 | Status: SHIPPED | OUTPATIENT
Start: 2022-12-22 | End: 2023-01-01

## 2022-12-22 RX ORDER — IXEKIZUMAB 80 MG/ML
INJECTION, SOLUTION SUBCUTANEOUS
COMMUNITY

## 2022-12-22 NOTE — TELEPHONE ENCOUNTER
Message received from Dr Sheridan Blackwell pager this morning. Spoke with pt, states he noticed a scratch on the lateral side of his left chest yesterday after using a back scratcher. This morning he noticed the scratch is very dark red, almost black. Reports redness, inflammation, and small, closed blisters \"the size of a baseball. \" States the area around the scratch is a deep red color. Reports temp of 99. Pt has taken ibuprofen and has not used any creams/lotions. Pt states his friend is a doctor and told him he needs evaluation and antibiotics. Pt states he will not be going to the ER. Pt advised to be evaluated in UC setting this morning. Pt agrees to visit [facility]. Nursing to F/U.

## 2023-11-01 ENCOUNTER — OFFICE VISIT (OUTPATIENT)
Dept: SURGERY | Facility: CLINIC | Age: 38
End: 2023-11-01

## 2023-11-01 DIAGNOSIS — N46.11 OLIGOSPERMIA: Primary | ICD-10-CM

## 2023-11-01 PROCEDURE — 99244 OFF/OP CNSLTJ NEW/EST MOD 40: CPT | Performed by: UROLOGY

## 2023-12-13 ENCOUNTER — TELEPHONE (OUTPATIENT)
Facility: LOCATION | Age: 38
End: 2023-12-13

## 2023-12-13 NOTE — TELEPHONE ENCOUNTER
LVM for patient to call Office to Change Appt to Video Visit for today.  If patient does not want that then they can go to Urgent Care

## 2024-01-04 ENCOUNTER — LAB ENCOUNTER (OUTPATIENT)
Dept: LAB | Age: 39
End: 2024-01-04
Attending: DERMATOLOGY
Payer: COMMERCIAL

## 2024-01-04 DIAGNOSIS — L40.0 PSORIASIS VULGARIS: Primary | ICD-10-CM

## 2024-01-04 LAB
ANION GAP SERPL CALC-SCNC: 5 MMOL/L (ref 0–18)
BASOPHILS # BLD AUTO: 0.05 X10(3) UL (ref 0–0.2)
BASOPHILS NFR BLD AUTO: 0.6 %
BUN BLD-MCNC: 13 MG/DL (ref 9–23)
BUN/CREAT SERPL: 13.4 (ref 10–20)
CALCIUM BLD-MCNC: 9.6 MG/DL (ref 8.7–10.4)
CHLORIDE SERPL-SCNC: 108 MMOL/L (ref 98–112)
CO2 SERPL-SCNC: 26 MMOL/L (ref 21–32)
CREAT BLD-MCNC: 0.97 MG/DL
DEPRECATED RDW RBC AUTO: 39.2 FL (ref 35.1–46.3)
EGFRCR SERPLBLD CKD-EPI 2021: 102 ML/MIN/1.73M2 (ref 60–?)
EOSINOPHIL # BLD AUTO: 0.38 X10(3) UL (ref 0–0.7)
EOSINOPHIL NFR BLD AUTO: 4.4 %
ERYTHROCYTE [DISTWIDTH] IN BLOOD BY AUTOMATED COUNT: 11.9 % (ref 11–15)
FASTING STATUS PATIENT QL REPORTED: NO
GLUCOSE BLD-MCNC: 77 MG/DL (ref 70–99)
HCT VFR BLD AUTO: 45.8 %
HGB BLD-MCNC: 15.6 G/DL
IMM GRANULOCYTES # BLD AUTO: 0.01 X10(3) UL (ref 0–1)
IMM GRANULOCYTES NFR BLD: 0.1 %
LYMPHOCYTES # BLD AUTO: 2.53 X10(3) UL (ref 1–4)
LYMPHOCYTES NFR BLD AUTO: 29.3 %
MCH RBC QN AUTO: 30.8 PG (ref 26–34)
MCHC RBC AUTO-ENTMCNC: 34.1 G/DL (ref 31–37)
MCV RBC AUTO: 90.5 FL
MONOCYTES # BLD AUTO: 0.65 X10(3) UL (ref 0.1–1)
MONOCYTES NFR BLD AUTO: 7.5 %
NEUTROPHILS # BLD AUTO: 5.01 X10 (3) UL (ref 1.5–7.7)
NEUTROPHILS # BLD AUTO: 5.01 X10(3) UL (ref 1.5–7.7)
NEUTROPHILS NFR BLD AUTO: 58.1 %
OSMOLALITY SERPL CALC.SUM OF ELEC: 287 MOSM/KG (ref 275–295)
PLATELET # BLD AUTO: 236 10(3)UL (ref 150–450)
POTASSIUM SERPL-SCNC: 3.7 MMOL/L (ref 3.5–5.1)
RBC # BLD AUTO: 5.06 X10(6)UL
SODIUM SERPL-SCNC: 139 MMOL/L (ref 136–145)
WBC # BLD AUTO: 8.6 X10(3) UL (ref 4–11)

## 2024-01-04 PROCEDURE — 85025 COMPLETE CBC W/AUTO DIFF WBC: CPT

## 2024-01-04 PROCEDURE — 36415 COLL VENOUS BLD VENIPUNCTURE: CPT

## 2024-01-04 PROCEDURE — 86480 TB TEST CELL IMMUN MEASURE: CPT

## 2024-01-04 PROCEDURE — 80048 BASIC METABOLIC PNL TOTAL CA: CPT

## 2024-02-08 ENCOUNTER — LAB ENCOUNTER (OUTPATIENT)
Dept: LAB | Age: 39
End: 2024-02-08
Attending: DERMATOLOGY
Payer: COMMERCIAL

## 2024-02-08 DIAGNOSIS — L40.0 PSORIASIS VULGARIS: ICD-10-CM

## 2024-02-08 PROCEDURE — 36415 COLL VENOUS BLD VENIPUNCTURE: CPT

## 2024-02-08 PROCEDURE — 86480 TB TEST CELL IMMUN MEASURE: CPT

## 2024-02-12 LAB
M TB IFN-G CD4+ T-CELLS BLD-ACNC: 0.01 IU/ML
M TB TUBERC IFN-G BLD QL: NEGATIVE
M TB TUBERC IGNF/MITOGEN IGNF CONTROL: >10 IU/ML
QFT TB1 AG MINUS NIL: -0.01 IU/ML
QFT TB2 AG MINUS NIL: -0.01 IU/ML

## 2024-02-18 RX ORDER — IXEKIZUMAB 80 MG/ML
INJECTION, SOLUTION SUBCUTANEOUS
Qty: 1.2 ML | Refills: 0 | OUTPATIENT
Start: 2024-02-18

## 2024-02-20 NOTE — TELEPHONE ENCOUNTER
Medication listed as external/historical.   Former patient of DR Lee .  Had telemedicine with DR Chavez on 12/13/23 due to COVID     No future appointments.      [employer] message sent with instruction to schedule for an establish care appointment and to contact the prescribing provider.

## 2024-04-25 ENCOUNTER — OFFICE VISIT (OUTPATIENT)
Dept: INTERNAL MEDICINE CLINIC | Facility: CLINIC | Age: 39
End: 2024-04-25

## 2024-04-25 VITALS
DIASTOLIC BLOOD PRESSURE: 96 MMHG | TEMPERATURE: 98 F | OXYGEN SATURATION: 99 % | RESPIRATION RATE: 18 BRPM | BODY MASS INDEX: 38.36 KG/M2 | WEIGHT: 315 LBS | HEART RATE: 87 BPM | SYSTOLIC BLOOD PRESSURE: 140 MMHG | HEIGHT: 76 IN

## 2024-04-25 DIAGNOSIS — Z00.00 ANNUAL PHYSICAL EXAM: Primary | ICD-10-CM

## 2024-04-25 DIAGNOSIS — M25.561 CHRONIC PAIN OF RIGHT KNEE: ICD-10-CM

## 2024-04-25 DIAGNOSIS — L40.9 PSORIASIS: ICD-10-CM

## 2024-04-25 DIAGNOSIS — Z78.9 ALCOHOL USE: ICD-10-CM

## 2024-04-25 DIAGNOSIS — R06.02 SOB (SHORTNESS OF BREATH) ON EXERTION: ICD-10-CM

## 2024-04-25 DIAGNOSIS — G89.29 CHRONIC PAIN OF RIGHT KNEE: ICD-10-CM

## 2024-04-25 PROCEDURE — 3008F BODY MASS INDEX DOCD: CPT | Performed by: INTERNAL MEDICINE

## 2024-04-25 PROCEDURE — 99395 PREV VISIT EST AGE 18-39: CPT | Performed by: INTERNAL MEDICINE

## 2024-04-25 PROCEDURE — 3077F SYST BP >= 140 MM HG: CPT | Performed by: INTERNAL MEDICINE

## 2024-04-25 PROCEDURE — 3080F DIAST BP >= 90 MM HG: CPT | Performed by: INTERNAL MEDICINE

## 2024-04-25 RX ORDER — LISINOPRIL 5 MG/1
5 TABLET ORAL DAILY
Qty: 30 TABLET | Refills: 2 | Status: SHIPPED | OUTPATIENT
Start: 2024-04-25

## 2024-04-30 ENCOUNTER — NURSE TRIAGE (OUTPATIENT)
Dept: INTERNAL MEDICINE CLINIC | Facility: CLINIC | Age: 39
End: 2024-04-30

## 2024-05-01 ENCOUNTER — LAB ENCOUNTER (OUTPATIENT)
Dept: LAB | Facility: HOSPITAL | Age: 39
End: 2024-05-01
Attending: INTERNAL MEDICINE
Payer: COMMERCIAL

## 2024-05-01 DIAGNOSIS — Z78.9 ALCOHOL USE: ICD-10-CM

## 2024-05-01 DIAGNOSIS — M25.561 CHRONIC PAIN OF RIGHT KNEE: ICD-10-CM

## 2024-05-01 DIAGNOSIS — Z00.00 ANNUAL PHYSICAL EXAM: ICD-10-CM

## 2024-05-01 DIAGNOSIS — R06.02 SOB (SHORTNESS OF BREATH) ON EXERTION: ICD-10-CM

## 2024-05-01 DIAGNOSIS — G89.29 CHRONIC PAIN OF RIGHT KNEE: ICD-10-CM

## 2024-05-01 DIAGNOSIS — L40.9 PSORIASIS: ICD-10-CM

## 2024-05-01 LAB
ALBUMIN SERPL-MCNC: 4.5 G/DL (ref 3.2–4.8)
ALBUMIN/GLOB SERPL: 1.4 {RATIO} (ref 1–2)
ALP LIVER SERPL-CCNC: 53 U/L
ALT SERPL-CCNC: 54 U/L
ANION GAP SERPL CALC-SCNC: 7 MMOL/L (ref 0–18)
AST SERPL-CCNC: 25 U/L (ref ?–34)
ATRIAL RATE: 78 BPM
BASOPHILS # BLD AUTO: 0.06 X10(3) UL (ref 0–0.2)
BASOPHILS NFR BLD AUTO: 0.6 %
BILIRUB SERPL-MCNC: 0.7 MG/DL (ref 0.3–1.2)
BILIRUB UR QL: NEGATIVE
BUN BLD-MCNC: 17 MG/DL (ref 9–23)
BUN/CREAT SERPL: 15.9 (ref 10–20)
CALCIUM BLD-MCNC: 10.1 MG/DL (ref 8.7–10.4)
CHLORIDE SERPL-SCNC: 105 MMOL/L (ref 98–112)
CHOLEST SERPL-MCNC: 192 MG/DL (ref ?–200)
CLARITY UR: CLEAR
CO2 SERPL-SCNC: 27 MMOL/L (ref 21–32)
CREAT BLD-MCNC: 1.07 MG/DL
DEPRECATED RDW RBC AUTO: 40.6 FL (ref 35.1–46.3)
EGFRCR SERPLBLD CKD-EPI 2021: 91 ML/MIN/1.73M2 (ref 60–?)
EOSINOPHIL # BLD AUTO: 0.45 X10(3) UL (ref 0–0.7)
EOSINOPHIL NFR BLD AUTO: 4.4 %
ERYTHROCYTE [DISTWIDTH] IN BLOOD BY AUTOMATED COUNT: 12.3 % (ref 11–15)
FASTING PATIENT LIPID ANSWER: NO
FASTING STATUS PATIENT QL REPORTED: NO
GLOBULIN PLAS-MCNC: 3.3 G/DL (ref 2.8–4.4)
GLUCOSE BLD-MCNC: 78 MG/DL (ref 70–99)
GLUCOSE UR-MCNC: NORMAL MG/DL
HCT VFR BLD AUTO: 45.5 %
HDLC SERPL-MCNC: 48 MG/DL (ref 40–59)
HGB BLD-MCNC: 15.6 G/DL
HGB UR QL STRIP.AUTO: NEGATIVE
IMM GRANULOCYTES # BLD AUTO: 0.04 X10(3) UL (ref 0–1)
IMM GRANULOCYTES NFR BLD: 0.4 %
KETONES UR-MCNC: NEGATIVE MG/DL
LDLC SERPL CALC-MCNC: 127 MG/DL (ref ?–100)
LEUKOCYTE ESTERASE UR QL STRIP.AUTO: NEGATIVE
LYMPHOCYTES # BLD AUTO: 2.57 X10(3) UL (ref 1–4)
LYMPHOCYTES NFR BLD AUTO: 25.1 %
MCH RBC QN AUTO: 31.1 PG (ref 26–34)
MCHC RBC AUTO-ENTMCNC: 34.3 G/DL (ref 31–37)
MCV RBC AUTO: 90.8 FL
MONOCYTES # BLD AUTO: 0.84 X10(3) UL (ref 0.1–1)
MONOCYTES NFR BLD AUTO: 8.2 %
NEUTROPHILS # BLD AUTO: 6.26 X10 (3) UL (ref 1.5–7.7)
NEUTROPHILS # BLD AUTO: 6.26 X10(3) UL (ref 1.5–7.7)
NEUTROPHILS NFR BLD AUTO: 61.3 %
NITRITE UR QL STRIP.AUTO: NEGATIVE
NONHDLC SERPL-MCNC: 144 MG/DL (ref ?–130)
OSMOLALITY SERPL CALC.SUM OF ELEC: 288 MOSM/KG (ref 275–295)
P AXIS: 45 DEGREES
P-R INTERVAL: 180 MS
PH UR: 5.5 [PH] (ref 5–8)
PLATELET # BLD AUTO: 243 10(3)UL (ref 150–450)
POTASSIUM SERPL-SCNC: 4.4 MMOL/L (ref 3.5–5.1)
PROT SERPL-MCNC: 7.8 G/DL (ref 5.7–8.2)
PROT UR-MCNC: NEGATIVE MG/DL
Q-T INTERVAL: 370 MS
QRS DURATION: 90 MS
QTC CALCULATION (BEZET): 421 MS
R AXIS: 27 DEGREES
RBC # BLD AUTO: 5.01 X10(6)UL
SODIUM SERPL-SCNC: 139 MMOL/L (ref 136–145)
SP GR UR STRIP: 1.02 (ref 1–1.03)
T AXIS: 38 DEGREES
TRIGL SERPL-MCNC: 91 MG/DL (ref 30–149)
TSI SER-ACNC: 2.98 MIU/ML (ref 0.55–4.78)
UROBILINOGEN UR STRIP-ACNC: NORMAL
VENTRICULAR RATE: 78 BPM
VLDLC SERPL CALC-MCNC: 16 MG/DL (ref 0–30)
WBC # BLD AUTO: 10.2 X10(3) UL (ref 4–11)

## 2024-05-01 PROCEDURE — 84443 ASSAY THYROID STIM HORMONE: CPT

## 2024-05-01 PROCEDURE — 93010 ELECTROCARDIOGRAM REPORT: CPT | Performed by: INTERNAL MEDICINE

## 2024-05-01 PROCEDURE — 85025 COMPLETE CBC W/AUTO DIFF WBC: CPT

## 2024-05-01 PROCEDURE — 81003 URINALYSIS AUTO W/O SCOPE: CPT

## 2024-05-01 PROCEDURE — 93005 ELECTROCARDIOGRAM TRACING: CPT

## 2024-05-01 PROCEDURE — 80061 LIPID PANEL: CPT

## 2024-05-01 PROCEDURE — 80053 COMPREHEN METABOLIC PANEL: CPT

## 2024-05-01 PROCEDURE — 36415 COLL VENOUS BLD VENIPUNCTURE: CPT

## 2024-05-01 NOTE — TELEPHONE ENCOUNTER
He should take his medication in the morning every day please get the labs done EKG and cardiac calcium score ordered be ordered if he has similar symptoms again he needs to go to ER

## 2024-05-01 NOTE — PROGRESS NOTES
Subjective:     Patient ID: Carlos Moya is a 38 year old male.    HPI  Patient comes in first time to establish care complaining of right knee pain has been going on for some time at times it gives out and pain.  Patient also with complaint of on and off chest pain some exertional dyspnea is a chronic problem  Patient drinks not every day but few times a week more than a few drinks   History/Other:   Review of Systems   Constitutional: Negative.    HENT: Negative.     Eyes: Negative.    Respiratory:  Positive for chest tightness and shortness of breath.    Cardiovascular: Negative.    Gastrointestinal: Negative.    Genitourinary: Negative.    Musculoskeletal:  Positive for arthralgias.   Skin: Negative.    Neurological: Negative.    Psychiatric/Behavioral: Negative.       Current Outpatient Medications   Medication Sig Dispense Refill    lisinopril 5 MG Oral Tab Take 1 tablet (5 mg total) by mouth daily. 30 tablet 2    Ixekizumab (TALTZ) 80 MG/ML Subcutaneous Solution Prefilled Syringe Inject into the skin every 30 (thirty) days.       Allergies:No Known Allergies    Past Medical History:    Psoriasis      Past Surgical History:   Procedure Laterality Date    Hand/finger surgery unlisted      Tonsillectomy        Family History   Problem Relation Age of Onset    Heart Disorder Father     Diabetes Mother     Cancer Mother     Cancer Maternal Grandfather     Heart Disorder Paternal Grandfather     Stroke Paternal Grandfather       Social History:   Social History     Socioeconomic History    Marital status: Single   Tobacco Use    Smoking status: Former     Types: Cigarettes    Smokeless tobacco: Former     Quit date: 10/7/2018   Vaping Use    Vaping status: Never Used   Substance and Sexual Activity    Alcohol use: Not Currently     Comment: social    Drug use: Never        Objective:   Physical Exam  Vitals and nursing note reviewed.   Constitutional:       Appearance: He is well-developed.   HENT:      Head:  Normocephalic and atraumatic.      Right Ear: External ear normal.      Left Ear: External ear normal.      Nose: Nose normal.   Eyes:      Conjunctiva/sclera: Conjunctivae normal.      Pupils: Pupils are equal, round, and reactive to light.   Cardiovascular:      Rate and Rhythm: Normal rate and regular rhythm.      Heart sounds: Normal heart sounds.   Pulmonary:      Effort: Pulmonary effort is normal.      Breath sounds: Normal breath sounds.   Abdominal:      General: Bowel sounds are normal.      Palpations: Abdomen is soft.   Genitourinary:     Penis: Normal.       Prostate: Normal.      Rectum: Normal.   Musculoskeletal:         General: Tenderness present. Normal range of motion.      Cervical back: Normal range of motion and neck supple.      Comments: Rt knee pain    Skin:     General: Skin is warm and dry.   Neurological:      Mental Status: He is alert and oriented to person, place, and time.      Deep Tendon Reflexes: Reflexes are normal and symmetric.         Assessment & Plan:   1. Annual physical exam exam as above we will order labs   2. SOB (shortness of breath) on exertion we will order do labs and EKG also encourage patient to get cardiac calcium score any worsening symptoms let us know   3. Psoriasis chronic stable   4. Chronic pain of right knee will refer to Ortho   5. Alcohol use encourage patient to cut down to no more than 1 drink a day       Orders Placed This Encounter   Procedures    CBC With Differential With Platelet    Comp Metabolic Panel (14)    Lipid Panel    TSH W Reflex To Free T4    Urinalysis, Routine       Meds This Visit:  Requested Prescriptions     Signed Prescriptions Disp Refills    Tetanus-Diphth-Acell Pertussis 5-2-15.5 LF-MCG/0.5 Intramuscular Suspension 0.5 mL 0     Sig: Inject 0.5 mL into the muscle once for 1 dose.    lisinopril 5 MG Oral Tab 30 tablet 2     Sig: Take 1 tablet (5 mg total) by mouth daily.       Imaging & Referrals:  ORTHOPEDIC - INTERNAL  CT CALCIUM  SCORING

## 2024-05-01 NOTE — TELEPHONE ENCOUNTER
Follow up call made to patient.    Yesterday had morning BLOOD PRESSURE 157/117 took medication and BLOOD PRESSURE went down to 133/97.    This early morning 151/101; took medication; after 2 hours 130/92   When his pressure is high, he feels tightness in center of chest.     He is At work now. Has moments of stress.    2 days ago had smoked, drank, not eating well. He states his father made him go to office yesterday to check his blood pressure.     Patient states he takes his lisinopril every morning now.     I advised patient to have his EKG and labs completed. Patient intends to go today.   He is aware of risks if he does not take care of himself.     We discussed red flags; if he has elevated BLOOD PRESSURE again with symptoms of chest pain, dizziness, sob, he needs to go to ER as soon as possible.  Patient verbalized understanding.      Reason for Disposition   Systolic BP >= 130 OR Diastolic >= 80, and is taking BP medications    Protocols used: Blood Pressure - High-A-OH

## 2024-05-01 NOTE — TELEPHONE ENCOUNTER
Patient walked in the office stated his blood pressure was elevated in the morning (did not take his blood pressure medication) 157/117 he was feeling dizzy earlier with head pressure . He went home for lunch and took his medication . Feeling better now his blood pressure he took at home was 133/97 . He feeling ok . He will go to [facility] station to have them take it to compare numbers. We did not have any medical staff in the office at this time to take his blood pressure . Advice him if he is not any better to go to er do not wait . He wanted this message to be forward to the doctor

## 2024-05-02 ENCOUNTER — OFFICE VISIT (OUTPATIENT)
Dept: INTERNAL MEDICINE CLINIC | Facility: CLINIC | Age: 39
End: 2024-05-02

## 2024-05-02 VITALS
RESPIRATION RATE: 18 BRPM | DIASTOLIC BLOOD PRESSURE: 84 MMHG | HEIGHT: 76 IN | HEART RATE: 78 BPM | WEIGHT: 315 LBS | SYSTOLIC BLOOD PRESSURE: 124 MMHG | OXYGEN SATURATION: 96 % | TEMPERATURE: 98 F | BODY MASS INDEX: 38.36 KG/M2

## 2024-05-02 DIAGNOSIS — G47.9 SLEEP DISTURBANCE: ICD-10-CM

## 2024-05-02 DIAGNOSIS — R06.81 APNEA: ICD-10-CM

## 2024-05-02 DIAGNOSIS — R53.83 OTHER FATIGUE: ICD-10-CM

## 2024-05-02 DIAGNOSIS — I10 PRIMARY HYPERTENSION: ICD-10-CM

## 2024-05-02 DIAGNOSIS — R06.09 EXERTIONAL DYSPNEA: Primary | ICD-10-CM

## 2024-05-02 DIAGNOSIS — R06.83 SNORING: ICD-10-CM

## 2024-05-02 PROCEDURE — 3074F SYST BP LT 130 MM HG: CPT | Performed by: INTERNAL MEDICINE

## 2024-05-02 PROCEDURE — 3079F DIAST BP 80-89 MM HG: CPT | Performed by: INTERNAL MEDICINE

## 2024-05-02 PROCEDURE — 99214 OFFICE O/P EST MOD 30 MIN: CPT | Performed by: INTERNAL MEDICINE

## 2024-05-02 PROCEDURE — 3008F BODY MASS INDEX DOCD: CPT | Performed by: INTERNAL MEDICINE

## 2024-05-02 NOTE — TELEPHONE ENCOUNTER
If patient unwilling to go to ER then he should come to see me either today or tomorrow but ideally if continues to have chest tightness pain shortness of breath he needs to go to ER

## 2024-05-02 NOTE — TELEPHONE ENCOUNTER
Spoke with Patient. Verified name and date of birth.  Informed him Dr. Toussaint's message.  Patient scheduled today. Requesting after 3pm appointment.  Future Appointments   Date Time Provider Department Center   5/2/2024  3:30 PM Newton Toussaint MD [id number] [address]   5/23/2024  5:30 PM Newton Toussaint MD [id number] [place] [id number]

## 2024-05-02 NOTE — TELEPHONE ENCOUNTER
Patient advised ER for evaluation  Patient states he will only go to ER if  says to go again.     Patient states he got EKG and blood tests done.     He has not scheduled CT calcium scoring test. Phone number provided to schedule CT calcium scoring test.    Patient states his chest feels tight, thinks it is from smoking. Coworkers have been sick, patient has a cough.     Blood pressures today were 133/95    150/101 this morning     Patient denies headaches or dizziness.

## 2024-05-06 DIAGNOSIS — E78.9 ABNORMAL CHOLESTEROL TEST: Primary | ICD-10-CM

## 2024-05-09 NOTE — PROGRESS NOTES
Subjective:     Patient ID: Carlos Moya is a 38 year old male.    HPI    Patient comes in for follow-up he has been taking blood pressure medication numbers at home okay patient does admit that he has been having more shortness of breath lately with some chest discomfort also he is concerned about sleep apnea has been told that he stops breathing for few seconds and then he chokes with snoring feels fatigue in the morning    History/Other:   Review of Systems   Constitutional:  Positive for fatigue. Negative for fever.   HENT: Negative.  Negative for congestion.    Eyes: Negative.    Respiratory:  Positive for shortness of breath. Negative for cough and wheezing.    Cardiovascular: Negative.  Negative for chest pain, palpitations and leg swelling.   Gastrointestinal: Negative.    Endocrine: Negative for cold intolerance and heat intolerance.   Genitourinary: Negative.  Negative for dysuria, flank pain and hematuria.   Musculoskeletal: Negative.  Negative for arthralgias, back pain and myalgias.   Skin: Negative.    Neurological: Negative.  Negative for dizziness, tremors, syncope, weakness and headaches.   Psychiatric/Behavioral:  Positive for sleep disturbance. Negative for agitation, behavioral problems and suicidal ideas. The patient is not nervous/anxious.      Current Outpatient Medications   Medication Sig Dispense Refill    lisinopril 5 MG Oral Tab Take 1 tablet (5 mg total) by mouth daily. 30 tablet 2    Ixekizumab (TALTZ) 80 MG/ML Subcutaneous Solution Prefilled Syringe Inject into the skin every 30 (thirty) days.       Allergies:No Known Allergies    Past Medical History:    Psoriasis      Past Surgical History:   Procedure Laterality Date    Hand/finger surgery unlisted      Tonsillectomy        Family History   Problem Relation Age of Onset    Heart Disorder Father     Diabetes Mother     Cancer Mother     Cancer Maternal Grandfather     Heart Disorder Paternal Grandfather     Stroke Paternal  Grandfather       Social History:   Social History     Socioeconomic History    Marital status: Single   Tobacco Use    Smoking status: Former     Types: Cigarettes    Smokeless tobacco: Former     Quit date: 10/7/2018   Vaping Use    Vaping status: Never Used   Substance and Sexual Activity    Alcohol use: Not Currently     Comment: social    Drug use: Never        Objective:   Physical Exam  Vitals and nursing note reviewed.   Constitutional:       Appearance: He is well-developed.   HENT:      Head: Normocephalic and atraumatic.      Right Ear: External ear normal.      Left Ear: External ear normal.      Nose: Nose normal.   Eyes:      Conjunctiva/sclera: Conjunctivae normal.      Pupils: Pupils are equal, round, and reactive to light.   Cardiovascular:      Rate and Rhythm: Normal rate and regular rhythm.      Heart sounds: Normal heart sounds.   Pulmonary:      Effort: Pulmonary effort is normal.      Breath sounds: Normal breath sounds.   Abdominal:      General: Bowel sounds are normal.      Palpations: Abdomen is soft.   Genitourinary:     Penis: Normal.       Prostate: Normal.      Rectum: Normal.   Musculoskeletal:         General: Normal range of motion.      Cervical back: Normal range of motion and neck supple.   Skin:     General: Skin is warm and dry.   Neurological:      Mental Status: He is alert and oriented to person, place, and time.      Deep Tendon Reflexes: Reflexes are normal and symmetric.         Assessment & Plan:   1. Exertional dyspnea we will order stress test we will follow results   2. Primary hypertension continue current treatment watch diet take medication   3. Snoring possible sleep apnea will order sleep study   4. Apnea as above we will follow results   5. Other fatigue we will follow results of the sleep study  study   6. Sleep disturbance  as above we will follow results of the sleep       No orders of the defined types were placed in this encounter.      Meds This  Visit:  Requested Prescriptions      No prescriptions requested or ordered in this encounter       Imaging & Referrals:  [facility] REFERRAL DIAGOSTIC SLEEP STUDY ADULT  CARD TREADMILL STRESS, ADULT (FXI=[id number])

## 2024-05-10 ENCOUNTER — HOSPITAL ENCOUNTER (OUTPATIENT)
Dept: CV DIAGNOSTICS | Facility: HOSPITAL | Age: 39
Discharge: HOME | End: 2024-05-10
Attending: INTERNAL MEDICINE

## 2024-05-10 DIAGNOSIS — R06.09 EXERTIONAL DYSPNEA: ICD-10-CM

## 2024-05-10 PROCEDURE — 93017 CV STRESS TEST TRACING ONLY: CPT | Performed by: INTERNAL MEDICINE

## 2024-05-10 PROCEDURE — 93018 CV STRESS TEST I&R ONLY: CPT | Performed by: INTERNAL MEDICINE

## 2024-05-10 PROCEDURE — 93016 CV STRESS TEST SUPVJ ONLY: CPT | Performed by: INTERNAL MEDICINE

## 2024-05-11 LAB
% OF MAX PREDICTED HR: 100 %
MAX DIASTOLIC BP: 70 MMHG
MAX HEART RATE: 157 BPM
MAX PREDICTED HEART RATE: 182 BPM
MAX SYSTOLIC BP: 180 MMHG
MAX WORK LOAD: 101

## 2024-07-31 RX ORDER — LISINOPRIL 5 MG/1
5 TABLET ORAL DAILY
Qty: 90 TABLET | Refills: 3 | Status: SHIPPED | OUTPATIENT
Start: 2024-07-31

## 2024-07-31 NOTE — TELEPHONE ENCOUNTER
Refill passes per [facility] protocol.    No future appointments.  LAST OFFICE VISIT: 5/2/2024    Requested Prescriptions   Pending Prescriptions Disp Refills    LISINOPRIL 5 MG Oral Tab [Pharmacy Med Name: Lisinopril 5 MG Oral Tablet] 30 tablet 0     Sig: Take 1 tablet by mouth once daily       Hypertension Medications Protocol Passed - 7/28/2024 10:05 AM        Passed - CMP or BMP in past 12 months        Passed - Last BP reading less than 140/90     BP Readings from Last 1 Encounters:   05/02/24 124/84               Passed - In person appointment or virtual visit in the past 12 mos or appointment in next 3 mos     Recent Outpatient Visits              3 months ago Exertional dyspnea    [facility], Newton Cervantes MD    Office Visit    3 months ago [facility]    7 months ago Respiratory tract infection due to COVID-19 virus    [facility] Kevin Chavez MD    Telemedicine    [facility]    1 year ago [facility] [place] Jessee Lee MD    Office Visit                      Passed - EGFRCR or GFRNAA > 50     GFR Evaluation  EGFRCR: 91 , resulted on 5/1/2024                 Recent Outpatient Visits              3 months ago [facility]ertional dyspnea    [facility]    3 months ago [facility] physical exam    [facility]    [facility]    7 months ago Respiratory tract infection due to COVID-19 virus    [facility] Kevin Chavez MD    Telemedicine    [facility] [place] [facility]    Office Visit    1 year ago Connie    [facility], [facility], [place]    Office Visit

## 2024-11-30 RX ORDER — LISINOPRIL 5 MG/1
5 TABLET ORAL DAILY
Qty: 90 TABLET | Refills: 3 | Status: SHIPPED | OUTPATIENT
Start: 2024-11-30

## 2024-11-30 NOTE — TELEPHONE ENCOUNTER
Refill passed per [facility] protocol.     Requested Prescriptions   Pending Prescriptions Disp Refills    lisinopril 5 MG Oral Tab 90 tablet 3     Sig: Take 1 tablet (5 mg total) by mouth daily.       Hypertension Medications Protocol Passed - 11/30/2024  7:55 AM        Passed - CMP or BMP in past 12 months        Passed - Last BP reading less than 140/90     BP Readings from Last 1 Encounters:   05/02/24 124/84               Passed - In person appointment or virtual visit in the past 12 mos or appointment in next 3 mos     Recent Outpatient Visits              7 months [facility]    7 months ago Annual physical exam    [facility] Newton Cervantes MD    Office Visit    11 months ago Respiratory tract infection due to COVID-19 virus    [facility]    [facility]    1 year ago Oligospermia    [facility] Rishi Carrera MD    Office Visit    1 year ago Rash    [facility] Jessee Lee MD    Office Visit          Future Appointments         Provider Department Appt Notes    In 1 week Newton Toussaint MD [facility] Blood pressure                    Passed - EGFRCR or GFRNAA > 50     GFR Evaluation  EGFRCR: 91 , resulted on 5/1/2024

## 2024-12-31 ENCOUNTER — TELEPHONE (OUTPATIENT)
Dept: INTERNAL MEDICINE CLINIC | Facility: CLINIC | Age: 39
End: 2024-12-31

## 2025-03-13 ENCOUNTER — MED REC SCAN ONLY (OUTPATIENT)
Dept: INTERNAL MEDICINE CLINIC | Facility: CLINIC | Age: 40
End: 2025-03-13

## 2025-04-04 ENCOUNTER — LAB ENCOUNTER (OUTPATIENT)
Dept: LAB | Age: 40
End: 2025-04-04
Attending: INTERNAL MEDICINE
Payer: COMMERCIAL

## 2025-04-04 DIAGNOSIS — E78.9 ABNORMAL CHOLESTEROL TEST: ICD-10-CM

## 2025-04-04 LAB
CHOLEST SERPL-MCNC: 152 MG/DL (ref ?–200)
FASTING PATIENT LIPID ANSWER: NO
HDLC SERPL-MCNC: 37 MG/DL (ref 40–59)
LDLC SERPL CALC-MCNC: 101 MG/DL (ref ?–100)
NONHDLC SERPL-MCNC: 115 MG/DL (ref ?–130)
TRIGL SERPL-MCNC: 71 MG/DL (ref 30–149)
VLDLC SERPL CALC-MCNC: 12 MG/DL (ref 0–30)

## 2025-04-04 PROCEDURE — 36415 COLL VENOUS BLD VENIPUNCTURE: CPT

## 2025-04-04 PROCEDURE — 80061 LIPID PANEL: CPT

## 2025-04-08 ENCOUNTER — MED REC SCAN ONLY (OUTPATIENT)
Dept: INTERNAL MEDICINE CLINIC | Facility: CLINIC | Age: 40
End: 2025-04-08

## 2025-04-24 ENCOUNTER — LAB ENCOUNTER (OUTPATIENT)
Dept: LAB | Age: 40
End: 2025-04-24
Attending: DERMATOLOGY
Payer: COMMERCIAL

## 2025-04-24 DIAGNOSIS — L40.0 PSORIASIS VULGARIS: Primary | ICD-10-CM

## 2025-04-24 LAB
ALBUMIN SERPL-MCNC: 4.5 G/DL (ref 3.2–4.8)
ALBUMIN/GLOB SERPL: 1.6 {RATIO} (ref 1–2)
ALP LIVER SERPL-CCNC: 46 U/L (ref 45–117)
ALT SERPL-CCNC: 32 U/L (ref 10–49)
ANION GAP SERPL CALC-SCNC: 7 MMOL/L (ref 0–18)
AST SERPL-CCNC: 19 U/L (ref ?–34)
BASOPHILS # BLD AUTO: 0.06 X10(3) UL (ref 0–0.2)
BASOPHILS NFR BLD AUTO: 0.9 %
BILIRUB DIRECT SERPL-MCNC: 0.2 MG/DL (ref ?–0.3)
BILIRUB SERPL-MCNC: 0.6 MG/DL (ref 0.3–1.2)
BUN BLD-MCNC: 20 MG/DL (ref 9–23)
BUN/CREAT SERPL: 20.6 (ref 10–20)
CALCIUM BLD-MCNC: 9.5 MG/DL (ref 8.7–10.4)
CHLORIDE SERPL-SCNC: 104 MMOL/L (ref 98–112)
CHOLEST SERPL-MCNC: 177 MG/DL (ref ?–200)
CO2 SERPL-SCNC: 27 MMOL/L (ref 21–32)
CREAT BLD-MCNC: 0.97 MG/DL (ref 0.7–1.3)
DEPRECATED RDW RBC AUTO: 40.8 FL (ref 35.1–46.3)
EGFRCR SERPLBLD CKD-EPI 2021: 102 ML/MIN/1.73M2 (ref 60–?)
EOSINOPHIL # BLD AUTO: 0.32 X10(3) UL (ref 0–0.7)
EOSINOPHIL NFR BLD AUTO: 4.5 %
ERYTHROCYTE [DISTWIDTH] IN BLOOD BY AUTOMATED COUNT: 12.5 % (ref 11–15)
FASTING PATIENT LIPID ANSWER: YES
FASTING STATUS PATIENT QL REPORTED: YES
GLOBULIN PLAS-MCNC: 2.9 G/DL (ref 2–3.5)
GLUCOSE BLD-MCNC: 95 MG/DL (ref 70–99)
HBV CORE AB SERPL QL IA: NONREACTIVE
HBV SURFACE AB SER QL: REACTIVE
HBV SURFACE AB SERPL IA-ACNC: 26.41 MIU/ML
HBV SURFACE AG SER-ACNC: <0.1 [IU]/L
HBV SURFACE AG SERPL QL IA: NONREACTIVE
HCT VFR BLD AUTO: 43.6 % (ref 39–53)
HCV AB SERPL QL IA: NONREACTIVE
HDLC SERPL-MCNC: 45 MG/DL (ref 40–59)
HGB BLD-MCNC: 14.8 G/DL (ref 13–17.5)
IMM GRANULOCYTES # BLD AUTO: 0.02 X10(3) UL (ref 0–1)
IMM GRANULOCYTES NFR BLD: 0.3 %
LDLC SERPL CALC-MCNC: 117 MG/DL (ref ?–100)
LYMPHOCYTES # BLD AUTO: 2.07 X10(3) UL (ref 1–4)
LYMPHOCYTES NFR BLD AUTO: 29.4 %
MCH RBC QN AUTO: 30.3 PG (ref 26–34)
MCHC RBC AUTO-ENTMCNC: 33.9 G/DL (ref 31–37)
MCV RBC AUTO: 89.3 FL (ref 80–100)
MONOCYTES # BLD AUTO: 0.52 X10(3) UL (ref 0.1–1)
MONOCYTES NFR BLD AUTO: 7.4 %
NEUTROPHILS # BLD AUTO: 4.06 X10 (3) UL (ref 1.5–7.7)
NEUTROPHILS # BLD AUTO: 4.06 X10(3) UL (ref 1.5–7.7)
NEUTROPHILS NFR BLD AUTO: 57.5 %
NONHDLC SERPL-MCNC: 132 MG/DL (ref ?–130)
OSMOLALITY SERPL CALC.SUM OF ELEC: 288 MOSM/KG (ref 275–295)
PLATELET # BLD AUTO: 212 10(3)UL (ref 150–450)
POTASSIUM SERPL-SCNC: 4.3 MMOL/L (ref 3.5–5.1)
PROT SERPL-MCNC: 7.4 G/DL (ref 5.7–8.2)
RBC # BLD AUTO: 4.88 X10(6)UL (ref 4.3–5.7)
SODIUM SERPL-SCNC: 138 MMOL/L (ref 136–145)
TRIGL SERPL-MCNC: 80 MG/DL (ref 30–149)
VLDLC SERPL CALC-MCNC: 14 MG/DL (ref 0–30)
WBC # BLD AUTO: 7.1 X10(3) UL (ref 4–11)

## 2025-04-24 PROCEDURE — 85025 COMPLETE CBC W/AUTO DIFF WBC: CPT

## 2025-04-24 PROCEDURE — 87340 HEPATITIS B SURFACE AG IA: CPT

## 2025-04-24 PROCEDURE — 80061 LIPID PANEL: CPT

## 2025-04-24 PROCEDURE — 86803 HEPATITIS C AB TEST: CPT

## 2025-04-24 PROCEDURE — 82248 BILIRUBIN DIRECT: CPT

## 2025-04-24 PROCEDURE — 86704 HEP B CORE ANTIBODY TOTAL: CPT

## 2025-04-24 PROCEDURE — 36415 COLL VENOUS BLD VENIPUNCTURE: CPT

## 2025-04-24 PROCEDURE — 80053 COMPREHEN METABOLIC PANEL: CPT

## 2025-04-24 PROCEDURE — 86480 TB TEST CELL IMMUN MEASURE: CPT

## 2025-04-24 PROCEDURE — 86706 HEP B SURFACE ANTIBODY: CPT

## 2025-04-28 LAB
M TB IFN-G CD4+ T-CELLS BLD-ACNC: 0.03 IU/ML
M TB TUBERC IFN-G BLD QL: NEGATIVE
M TB TUBERC IGNF/MITOGEN IGNF CONTROL: >10 IU/ML
QFT TB1 AG MINUS NIL: -0.01 IU/ML
QFT TB2 AG MINUS NIL: -0.01 IU/ML

## (undated) NOTE — LETTER
[facility], [place] [facility] [place] [id number] [phone]        Dear Dr. Oriana Scott:      Kaushal Mock is currently under my medical care and is suffering from recurrent dizziness most likely secondary

## (undated) NOTE — LETTER
6/10/2020      Saba Castro  [id number] [facility] [id number]         To Whom It May Concern:    Saba Castro has been under our care regarding ongoing medical issues. Because of this, he has been required to restrict her physical activities.     He may r